# Patient Record
Sex: FEMALE | Employment: OTHER | URBAN - METROPOLITAN AREA
[De-identification: names, ages, dates, MRNs, and addresses within clinical notes are randomized per-mention and may not be internally consistent; named-entity substitution may affect disease eponyms.]

---

## 2020-01-01 ENCOUNTER — DOCUMENTATION ONLY (OUTPATIENT)
Dept: OTHER | Facility: CLINIC | Age: 85
End: 2020-01-01

## 2020-01-01 ENCOUNTER — APPOINTMENT (OUTPATIENT)
Dept: CT IMAGING | Facility: CLINIC | Age: 85
DRG: 064 | End: 2020-01-01
Attending: EMERGENCY MEDICINE
Payer: COMMERCIAL

## 2020-01-01 ENCOUNTER — HOSPITAL ENCOUNTER (OUTPATIENT)
Facility: CLINIC | Age: 85
End: 2020-01-01
Admitting: RADIOLOGY
Payer: COMMERCIAL

## 2020-01-01 ENCOUNTER — MEDICAL CORRESPONDENCE (OUTPATIENT)
Dept: HEALTH INFORMATION MANAGEMENT | Facility: CLINIC | Age: 85
End: 2020-01-01

## 2020-01-01 ENCOUNTER — APPOINTMENT (OUTPATIENT)
Dept: SPEECH THERAPY | Facility: CLINIC | Age: 85
DRG: 064 | End: 2020-01-01
Attending: INTERNAL MEDICINE
Payer: COMMERCIAL

## 2020-01-01 ENCOUNTER — APPOINTMENT (OUTPATIENT)
Dept: GENERAL RADIOLOGY | Facility: CLINIC | Age: 85
DRG: 064 | End: 2020-01-01
Attending: EMERGENCY MEDICINE
Payer: COMMERCIAL

## 2020-01-01 ENCOUNTER — APPOINTMENT (OUTPATIENT)
Dept: MRI IMAGING | Facility: CLINIC | Age: 85
DRG: 064 | End: 2020-01-01
Payer: COMMERCIAL

## 2020-01-01 ENCOUNTER — APPOINTMENT (OUTPATIENT)
Dept: CARDIOLOGY | Facility: CLINIC | Age: 85
DRG: 064 | End: 2020-01-01
Payer: COMMERCIAL

## 2020-01-01 ENCOUNTER — HOSPITAL ENCOUNTER (EMERGENCY)
Facility: CLINIC | Age: 85
End: 2020-01-01

## 2020-01-01 ENCOUNTER — HOSPITAL ENCOUNTER (INPATIENT)
Facility: CLINIC | Age: 85
LOS: 3 days | DRG: 064 | End: 2020-02-03
Attending: EMERGENCY MEDICINE | Admitting: INTERNAL MEDICINE
Payer: COMMERCIAL

## 2020-01-01 ENCOUNTER — TRANSFERRED RECORDS (OUTPATIENT)
Dept: HEALTH INFORMATION MANAGEMENT | Facility: CLINIC | Age: 85
End: 2020-01-01

## 2020-01-01 VITALS
BODY MASS INDEX: 30.9 KG/M2 | HEIGHT: 58 IN | OXYGEN SATURATION: 96 % | DIASTOLIC BLOOD PRESSURE: 55 MMHG | HEART RATE: 99 BPM | TEMPERATURE: 97.3 F | RESPIRATION RATE: 20 BRPM | SYSTOLIC BLOOD PRESSURE: 112 MMHG | WEIGHT: 147.2 LBS

## 2020-01-01 DIAGNOSIS — R41.82 ALTERED MENTAL STATUS, UNSPECIFIED ALTERED MENTAL STATUS TYPE: ICD-10-CM

## 2020-01-01 DIAGNOSIS — E86.0 DEHYDRATION: ICD-10-CM

## 2020-01-01 DIAGNOSIS — C18.9 MALIGNANT NEOPLASM OF COLON, UNSPECIFIED PART OF COLON (H): ICD-10-CM

## 2020-01-01 DIAGNOSIS — R29.90 STROKE-LIKE SYMPTOMS: ICD-10-CM

## 2020-01-01 LAB
ALBUMIN SERPL-MCNC: 2.4 G/DL (ref 3.4–5)
ALBUMIN UR-MCNC: 10 MG/DL
ALP SERPL-CCNC: 153 U/L (ref 40–150)
ALT SERPL W P-5'-P-CCNC: 16 U/L (ref 0–50)
ANION GAP SERPL CALCULATED.3IONS-SCNC: 10 MMOL/L (ref 3–14)
APPEARANCE UR: ABNORMAL
AST SERPL W P-5'-P-CCNC: 80 U/L (ref 0–45)
BASOPHILS # BLD AUTO: 0 10E9/L (ref 0–0.2)
BASOPHILS NFR BLD AUTO: 0.1 %
BILIRUB SERPL-MCNC: 1.2 MG/DL (ref 0.2–1.3)
BILIRUB UR QL STRIP: NEGATIVE
BUN SERPL-MCNC: 38 MG/DL (ref 7–30)
CALCIUM SERPL-MCNC: 9.1 MG/DL (ref 8.5–10.1)
CHLORIDE SERPL-SCNC: 100 MMOL/L (ref 94–109)
CO2 SERPL-SCNC: 23 MMOL/L (ref 20–32)
COLOR UR AUTO: YELLOW
CREAT SERPL-MCNC: 1.24 MG/DL (ref 0.52–1.04)
DIFFERENTIAL METHOD BLD: ABNORMAL
EOSINOPHIL # BLD AUTO: 0 10E9/L (ref 0–0.7)
EOSINOPHIL NFR BLD AUTO: 0 %
ERYTHROCYTE [DISTWIDTH] IN BLOOD BY AUTOMATED COUNT: 17.7 % (ref 10–15)
GFR SERPL CREATININE-BSD FRML MDRD: 38 ML/MIN/{1.73_M2}
GLUCOSE BLDC GLUCOMTR-MCNC: 160 MG/DL (ref 70–99)
GLUCOSE BLDC GLUCOMTR-MCNC: 171 MG/DL (ref 70–99)
GLUCOSE BLDC GLUCOMTR-MCNC: 177 MG/DL (ref 70–99)
GLUCOSE BLDC GLUCOMTR-MCNC: 181 MG/DL (ref 70–99)
GLUCOSE BLDC GLUCOMTR-MCNC: 185 MG/DL (ref 70–99)
GLUCOSE BLDC GLUCOMTR-MCNC: 209 MG/DL (ref 70–99)
GLUCOSE BLDC GLUCOMTR-MCNC: 210 MG/DL (ref 70–99)
GLUCOSE BLDC GLUCOMTR-MCNC: 215 MG/DL (ref 70–99)
GLUCOSE BLDC GLUCOMTR-MCNC: 217 MG/DL (ref 70–99)
GLUCOSE BLDC GLUCOMTR-MCNC: 225 MG/DL (ref 70–99)
GLUCOSE BLDC GLUCOMTR-MCNC: 240 MG/DL (ref 70–99)
GLUCOSE BLDC GLUCOMTR-MCNC: 242 MG/DL (ref 70–99)
GLUCOSE SERPL-MCNC: 276 MG/DL (ref 70–99)
GLUCOSE UR STRIP-MCNC: NEGATIVE MG/DL
HBA1C MFR BLD: 8.6 % (ref 0–5.6)
HCT VFR BLD AUTO: 36.5 % (ref 35–47)
HGB BLD-MCNC: 11.7 G/DL (ref 11.7–15.7)
HGB UR QL STRIP: NEGATIVE
HYALINE CASTS #/AREA URNS LPF: 21 /LPF (ref 0–2)
IMM GRANULOCYTES # BLD: 0.3 10E9/L (ref 0–0.4)
IMM GRANULOCYTES NFR BLD: 1.3 %
INTERPRETATION ECG - MUSE: NORMAL
INTERPRETATION ECG - MUSE: NORMAL
KETONES UR STRIP-MCNC: NEGATIVE MG/DL
LACTATE BLD-SCNC: 2.3 MMOL/L (ref 0.7–2)
LACTATE BLD-SCNC: 3.1 MMOL/L (ref 0.7–2)
LEUKOCYTE ESTERASE UR QL STRIP: ABNORMAL
LIPASE SERPL-CCNC: 95 U/L (ref 73–393)
LYMPHOCYTES # BLD AUTO: 0.5 10E9/L (ref 0.8–5.3)
LYMPHOCYTES NFR BLD AUTO: 2.3 %
MAGNESIUM SERPL-MCNC: 2 MG/DL (ref 1.6–2.3)
MCH RBC QN AUTO: 30.2 PG (ref 26.5–33)
MCHC RBC AUTO-ENTMCNC: 32.1 G/DL (ref 31.5–36.5)
MCV RBC AUTO: 94 FL (ref 78–100)
MONOCYTES # BLD AUTO: 0.7 10E9/L (ref 0–1.3)
MONOCYTES NFR BLD AUTO: 3.3 %
MUCOUS THREADS #/AREA URNS LPF: PRESENT /LPF
NEUTROPHILS # BLD AUTO: 20.7 10E9/L (ref 1.6–8.3)
NEUTROPHILS NFR BLD AUTO: 93 %
NITRATE UR QL: NEGATIVE
PH UR STRIP: 5 PH (ref 5–7)
PLATELET # BLD AUTO: 220 10E9/L (ref 150–450)
POTASSIUM SERPL-SCNC: 4.2 MMOL/L (ref 3.4–5.3)
PROT SERPL-MCNC: 6 G/DL (ref 6.8–8.8)
RBC # BLD AUTO: 3.88 10E12/L (ref 3.8–5.2)
RBC #/AREA URNS AUTO: 2 /HPF (ref 0–2)
SODIUM SERPL-SCNC: 133 MMOL/L (ref 133–144)
SOURCE: ABNORMAL
SP GR UR STRIP: 1.02 (ref 1–1.03)
SQUAMOUS #/AREA URNS AUTO: 1 /HPF (ref 0–1)
UROBILINOGEN UR STRIP-MCNC: 2 MG/DL (ref 0–2)
WBC # BLD AUTO: 22.3 10E9/L (ref 4–11)
WBC #/AREA URNS AUTO: 5 /HPF (ref 0–5)
YEAST #/AREA URNS HPF: ABNORMAL /HPF

## 2020-01-01 PROCEDURE — 99223 1ST HOSP IP/OBS HIGH 75: CPT | Mod: AI | Performed by: INTERNAL MEDICINE

## 2020-01-01 PROCEDURE — 25800030 ZZH RX IP 258 OP 636: Performed by: INTERNAL MEDICINE

## 2020-01-01 PROCEDURE — 70496 CT ANGIOGRAPHY HEAD: CPT

## 2020-01-01 PROCEDURE — 25000125 ZZHC RX 250: Performed by: EMERGENCY MEDICINE

## 2020-01-01 PROCEDURE — 25000128 H RX IP 250 OP 636: Performed by: PHYSICIAN ASSISTANT

## 2020-01-01 PROCEDURE — 99207 ZZC APP CREDIT; MD BILLING SHARED VISIT: CPT | Performed by: INTERNAL MEDICINE

## 2020-01-01 PROCEDURE — 93005 ELECTROCARDIOGRAM TRACING: CPT

## 2020-01-01 PROCEDURE — 99233 SBSQ HOSP IP/OBS HIGH 50: CPT | Performed by: INTERNAL MEDICINE

## 2020-01-01 PROCEDURE — 25000125 ZZHC RX 250: Performed by: HOSPITALIST

## 2020-01-01 PROCEDURE — 95819 EEG AWAKE AND ASLEEP: CPT

## 2020-01-01 PROCEDURE — 40000895 ZZH STATISTIC SLP IP EVAL DEFER

## 2020-01-01 PROCEDURE — 12000000 ZZH R&B MED SURG/OB

## 2020-01-01 PROCEDURE — 83690 ASSAY OF LIPASE: CPT | Performed by: EMERGENCY MEDICINE

## 2020-01-01 PROCEDURE — 83605 ASSAY OF LACTIC ACID: CPT | Performed by: INTERNAL MEDICINE

## 2020-01-01 PROCEDURE — 94640 AIRWAY INHALATION TREATMENT: CPT

## 2020-01-01 PROCEDURE — 99233 SBSQ HOSP IP/OBS HIGH 50: CPT | Mod: GC | Performed by: PSYCHIATRY & NEUROLOGY

## 2020-01-01 PROCEDURE — 83605 ASSAY OF LACTIC ACID: CPT | Performed by: EMERGENCY MEDICINE

## 2020-01-01 PROCEDURE — 0042T CT HEAD PERFUSION WITH CONTRAST: CPT

## 2020-01-01 PROCEDURE — 25000132 ZZH RX MED GY IP 250 OP 250 PS 637: Performed by: INTERNAL MEDICINE

## 2020-01-01 PROCEDURE — 70450 CT HEAD/BRAIN W/O DYE: CPT

## 2020-01-01 PROCEDURE — 71046 X-RAY EXAM CHEST 2 VIEWS: CPT

## 2020-01-01 PROCEDURE — 95813 EEG EXTND MNTR 61-119 MIN: CPT

## 2020-01-01 PROCEDURE — 85025 COMPLETE CBC W/AUTO DIFF WBC: CPT | Performed by: EMERGENCY MEDICINE

## 2020-01-01 PROCEDURE — 96361 HYDRATE IV INFUSION ADD-ON: CPT

## 2020-01-01 PROCEDURE — 25000132 ZZH RX MED GY IP 250 OP 250 PS 637: Performed by: STUDENT IN AN ORGANIZED HEALTH CARE EDUCATION/TRAINING PROGRAM

## 2020-01-01 PROCEDURE — 87040 BLOOD CULTURE FOR BACTERIA: CPT | Performed by: EMERGENCY MEDICINE

## 2020-01-01 PROCEDURE — 96374 THER/PROPH/DIAG INJ IV PUSH: CPT | Mod: 59

## 2020-01-01 PROCEDURE — 00000146 ZZHCL STATISTIC GLUCOSE BY METER IP

## 2020-01-01 PROCEDURE — 99207 ZZC MOONLIGHTING INDICATOR: CPT | Performed by: INTERNAL MEDICINE

## 2020-01-01 PROCEDURE — 25000125 ZZHC RX 250: Performed by: INTERNAL MEDICINE

## 2020-01-01 PROCEDURE — 92526 ORAL FUNCTION THERAPY: CPT | Mod: GN | Performed by: SPEECH-LANGUAGE PATHOLOGIST

## 2020-01-01 PROCEDURE — 25800030 ZZH RX IP 258 OP 636: Performed by: EMERGENCY MEDICINE

## 2020-01-01 PROCEDURE — 92610 EVALUATE SWALLOWING FUNCTION: CPT | Mod: GN | Performed by: SPEECH-LANGUAGE PATHOLOGIST

## 2020-01-01 PROCEDURE — A9585 GADOBUTROL INJECTION: HCPCS | Performed by: EMERGENCY MEDICINE

## 2020-01-01 PROCEDURE — 99221 1ST HOSP IP/OBS SF/LOW 40: CPT | Mod: GC | Performed by: PSYCHIATRY & NEUROLOGY

## 2020-01-01 PROCEDURE — 80053 COMPREHEN METABOLIC PANEL: CPT | Performed by: EMERGENCY MEDICINE

## 2020-01-01 PROCEDURE — 25500064 ZZH RX 255 OP 636: Performed by: INTERNAL MEDICINE

## 2020-01-01 PROCEDURE — 25000128 H RX IP 250 OP 636: Performed by: HOSPITALIST

## 2020-01-01 PROCEDURE — 25000128 H RX IP 250 OP 636: Performed by: STUDENT IN AN ORGANIZED HEALTH CARE EDUCATION/TRAINING PROGRAM

## 2020-01-01 PROCEDURE — 40000061 ZZH STATISTIC EEG TIME EA 10 MIN

## 2020-01-01 PROCEDURE — 93010 ELECTROCARDIOGRAM REPORT: CPT | Performed by: INTERNAL MEDICINE

## 2020-01-01 PROCEDURE — 25000132 ZZH RX MED GY IP 250 OP 250 PS 637: Performed by: EMERGENCY MEDICINE

## 2020-01-01 PROCEDURE — 83735 ASSAY OF MAGNESIUM: CPT | Performed by: EMERGENCY MEDICINE

## 2020-01-01 PROCEDURE — 93306 TTE W/DOPPLER COMPLETE: CPT | Mod: 26 | Performed by: INTERNAL MEDICINE

## 2020-01-01 PROCEDURE — 70553 MRI BRAIN STEM W/O & W/DYE: CPT

## 2020-01-01 PROCEDURE — 25500064 ZZH RX 255 OP 636: Performed by: EMERGENCY MEDICINE

## 2020-01-01 PROCEDURE — 83036 HEMOGLOBIN GLYCOSYLATED A1C: CPT | Performed by: EMERGENCY MEDICINE

## 2020-01-01 PROCEDURE — 81001 URINALYSIS AUTO W/SCOPE: CPT | Performed by: EMERGENCY MEDICINE

## 2020-01-01 PROCEDURE — 99291 CRITICAL CARE FIRST HOUR: CPT

## 2020-01-01 PROCEDURE — 40000275 ZZH STATISTIC RCP TIME EA 10 MIN

## 2020-01-01 PROCEDURE — 94640 AIRWAY INHALATION TREATMENT: CPT | Mod: 76

## 2020-01-01 PROCEDURE — 25000128 H RX IP 250 OP 636: Performed by: INTERNAL MEDICINE

## 2020-01-01 PROCEDURE — 99238 HOSP IP/OBS DSCHRG MGMT 30/<: CPT | Performed by: NURSE PRACTITIONER

## 2020-01-01 PROCEDURE — 25000131 ZZH RX MED GY IP 250 OP 636 PS 637: Performed by: INTERNAL MEDICINE

## 2020-01-01 PROCEDURE — 25000128 H RX IP 250 OP 636: Performed by: EMERGENCY MEDICINE

## 2020-01-01 PROCEDURE — 36415 COLL VENOUS BLD VENIPUNCTURE: CPT | Performed by: INTERNAL MEDICINE

## 2020-01-01 RX ORDER — ONDANSETRON 4 MG/1
4 TABLET, ORALLY DISINTEGRATING ORAL EVERY 6 HOURS PRN
Status: DISCONTINUED | OUTPATIENT
Start: 2020-01-01 | End: 2020-01-01 | Stop reason: HOSPADM

## 2020-01-01 RX ORDER — POTASSIUM CHLORIDE 29.8 MG/ML
20 INJECTION INTRAVENOUS
Status: DISCONTINUED | OUTPATIENT
Start: 2020-01-01 | End: 2020-01-01 | Stop reason: CLARIF

## 2020-01-01 RX ORDER — SALIVA STIMULANT COMB. NO.3
1 SPRAY, NON-AEROSOL (ML) MUCOUS MEMBRANE
Status: DISCONTINUED | OUTPATIENT
Start: 2020-01-01 | End: 2020-01-01 | Stop reason: HOSPADM

## 2020-01-01 RX ORDER — SIMVASTATIN 20 MG
20 TABLET ORAL EVERY EVENING
Status: DISCONTINUED | OUTPATIENT
Start: 2020-01-01 | End: 2020-01-01

## 2020-01-01 RX ORDER — ACETAMINOPHEN 325 MG/1
650 TABLET ORAL EVERY 6 HOURS PRN
Status: DISCONTINUED | OUTPATIENT
Start: 2020-01-01 | End: 2020-01-01 | Stop reason: HOSPADM

## 2020-01-01 RX ORDER — CAPECITABINE 500 MG/1
1000 TABLET, FILM COATED ORAL 2 TIMES DAILY
COMMUNITY

## 2020-01-01 RX ORDER — POTASSIUM CHLORIDE 7.45 MG/ML
10 INJECTION INTRAVENOUS
Status: DISCONTINUED | OUTPATIENT
Start: 2020-01-01 | End: 2020-01-01

## 2020-01-01 RX ORDER — HEPARIN SODIUM 5000 [USP'U]/.5ML
5000 INJECTION, SOLUTION INTRAVENOUS; SUBCUTANEOUS EVERY 12 HOURS
Status: DISCONTINUED | OUTPATIENT
Start: 2020-01-01 | End: 2020-01-01

## 2020-01-01 RX ORDER — NALOXONE HYDROCHLORIDE 0.4 MG/ML
.1-.4 INJECTION, SOLUTION INTRAMUSCULAR; INTRAVENOUS; SUBCUTANEOUS
Status: DISCONTINUED | OUTPATIENT
Start: 2020-01-01 | End: 2020-01-01 | Stop reason: HOSPADM

## 2020-01-01 RX ORDER — POLYETHYLENE GLYCOL 3350 17 G/17G
17 POWDER, FOR SOLUTION ORAL DAILY PRN
Status: DISCONTINUED | OUTPATIENT
Start: 2020-01-01 | End: 2020-01-01 | Stop reason: HOSPADM

## 2020-01-01 RX ORDER — HYDROMORPHONE HYDROCHLORIDE 1 MG/ML
1-2 SOLUTION ORAL
Status: DISCONTINUED | OUTPATIENT
Start: 2020-01-01 | End: 2020-01-01 | Stop reason: ALTCHOICE

## 2020-01-01 RX ORDER — MORPHINE SULFATE 100 MG/5ML
10-20 SOLUTION ORAL
Status: DISCONTINUED | OUTPATIENT
Start: 2020-01-01 | End: 2020-01-01 | Stop reason: HOSPADM

## 2020-01-01 RX ORDER — FUROSEMIDE 10 MG/ML
20 INJECTION INTRAMUSCULAR; INTRAVENOUS ONCE
Status: COMPLETED | OUTPATIENT
Start: 2020-01-01 | End: 2020-01-01

## 2020-01-01 RX ORDER — POTASSIUM CL/LIDO/0.9 % NACL 10MEQ/0.1L
10 INTRAVENOUS SOLUTION, PIGGYBACK (ML) INTRAVENOUS
Status: DISCONTINUED | OUTPATIENT
Start: 2020-01-01 | End: 2020-01-01

## 2020-01-01 RX ORDER — AMOXICILLIN 250 MG
1 CAPSULE ORAL 2 TIMES DAILY PRN
Status: DISCONTINUED | OUTPATIENT
Start: 2020-01-01 | End: 2020-01-01 | Stop reason: HOSPADM

## 2020-01-01 RX ORDER — CYCLOSPORINE 0.5 MG/ML
1 EMULSION OPHTHALMIC 2 TIMES DAILY
COMMUNITY

## 2020-01-01 RX ORDER — BISACODYL 10 MG
10 SUPPOSITORY, RECTAL RECTAL DAILY PRN
Status: DISCONTINUED | OUTPATIENT
Start: 2020-01-01 | End: 2020-01-01 | Stop reason: HOSPADM

## 2020-01-01 RX ORDER — ASPIRIN 81 MG/1
81 TABLET, CHEWABLE ORAL DAILY
Status: DISCONTINUED | OUTPATIENT
Start: 2020-01-01 | End: 2020-01-01

## 2020-01-01 RX ORDER — PROCHLORPERAZINE 25 MG
12.5 SUPPOSITORY, RECTAL RECTAL EVERY 12 HOURS PRN
Status: DISCONTINUED | OUTPATIENT
Start: 2020-01-01 | End: 2020-01-01 | Stop reason: HOSPADM

## 2020-01-01 RX ORDER — IOPAMIDOL 755 MG/ML
120 INJECTION, SOLUTION INTRAVASCULAR ONCE
Status: COMPLETED | OUTPATIENT
Start: 2020-01-01 | End: 2020-01-01

## 2020-01-01 RX ORDER — SIMVASTATIN 10 MG
10 TABLET ORAL AT BEDTIME
COMMUNITY

## 2020-01-01 RX ORDER — MORPHINE SULFATE 10 MG/5ML
10 SOLUTION ORAL
Status: DISCONTINUED | OUTPATIENT
Start: 2020-01-01 | End: 2020-01-01 | Stop reason: DRUGHIGH

## 2020-01-01 RX ORDER — LORAZEPAM 2 MG/ML
.5-1 INJECTION INTRAMUSCULAR
Status: DISCONTINUED | OUTPATIENT
Start: 2020-01-01 | End: 2020-01-01 | Stop reason: HOSPADM

## 2020-01-01 RX ORDER — OXYCODONE HYDROCHLORIDE 5 MG/1
5 TABLET ORAL EVERY 4 HOURS PRN
COMMUNITY

## 2020-01-01 RX ORDER — LORAZEPAM 2 MG/ML
2 INJECTION INTRAMUSCULAR ONCE
Status: COMPLETED | OUTPATIENT
Start: 2020-01-01 | End: 2020-01-01

## 2020-01-01 RX ORDER — POTASSIUM CHLORIDE 1.5 G/1.58G
20-40 POWDER, FOR SOLUTION ORAL
Status: DISCONTINUED | OUTPATIENT
Start: 2020-01-01 | End: 2020-01-01

## 2020-01-01 RX ORDER — METOPROLOL TARTRATE 1 MG/ML
5 INJECTION, SOLUTION INTRAVENOUS EVERY 4 HOURS PRN
Status: DISCONTINUED | OUTPATIENT
Start: 2020-01-01 | End: 2020-01-01 | Stop reason: HOSPADM

## 2020-01-01 RX ORDER — GLIMEPIRIDE 2 MG/1
2 TABLET ORAL EVERY EVENING
COMMUNITY

## 2020-01-01 RX ORDER — AMLODIPINE BESYLATE 2.5 MG/1
2.5 TABLET ORAL EVERY EVENING
COMMUNITY

## 2020-01-01 RX ORDER — GLYCOPYRROLATE 1 MG/1
2-4 TABLET ORAL EVERY 4 HOURS PRN
Status: DISCONTINUED | OUTPATIENT
Start: 2020-01-01 | End: 2020-01-01 | Stop reason: HOSPADM

## 2020-01-01 RX ORDER — LOSARTAN POTASSIUM 25 MG/1
25 TABLET ORAL EVERY EVENING
COMMUNITY

## 2020-01-01 RX ORDER — LORAZEPAM 0.5 MG/1
.5-1 TABLET ORAL
Status: DISCONTINUED | OUTPATIENT
Start: 2020-01-01 | End: 2020-01-01 | Stop reason: HOSPADM

## 2020-01-01 RX ORDER — AMOXICILLIN 250 MG
2 CAPSULE ORAL 2 TIMES DAILY PRN
Status: DISCONTINUED | OUTPATIENT
Start: 2020-01-01 | End: 2020-01-01 | Stop reason: HOSPADM

## 2020-01-01 RX ORDER — LIDOCAINE 40 MG/G
CREAM TOPICAL
Status: DISCONTINUED | OUTPATIENT
Start: 2020-01-01 | End: 2020-01-01 | Stop reason: HOSPADM

## 2020-01-01 RX ORDER — HYDROMORPHONE HYDROCHLORIDE 1 MG/ML
.3-.5 INJECTION, SOLUTION INTRAMUSCULAR; INTRAVENOUS; SUBCUTANEOUS
Status: DISCONTINUED | OUTPATIENT
Start: 2020-01-01 | End: 2020-01-01 | Stop reason: HOSPADM

## 2020-01-01 RX ORDER — OXYCODONE HCL 10 MG/1
10 TABLET, FILM COATED, EXTENDED RELEASE ORAL EVERY MORNING
COMMUNITY

## 2020-01-01 RX ORDER — ASPIRIN 300 MG/1
300 SUPPOSITORY RECTAL ONCE
Status: COMPLETED | OUTPATIENT
Start: 2020-01-01 | End: 2020-01-01

## 2020-01-01 RX ORDER — PROCHLORPERAZINE MALEATE 5 MG
5 TABLET ORAL EVERY 6 HOURS PRN
Status: DISCONTINUED | OUTPATIENT
Start: 2020-01-01 | End: 2020-01-01 | Stop reason: HOSPADM

## 2020-01-01 RX ORDER — ONDANSETRON 2 MG/ML
4 INJECTION INTRAMUSCULAR; INTRAVENOUS EVERY 6 HOURS PRN
Status: DISCONTINUED | OUTPATIENT
Start: 2020-01-01 | End: 2020-01-01 | Stop reason: HOSPADM

## 2020-01-01 RX ORDER — HALOPERIDOL 5 MG/ML
.5-1 INJECTION INTRAMUSCULAR
Status: DISCONTINUED | OUTPATIENT
Start: 2020-01-01 | End: 2020-01-01 | Stop reason: HOSPADM

## 2020-01-01 RX ORDER — DEXTROSE MONOHYDRATE 25 G/50ML
25-50 INJECTION, SOLUTION INTRAVENOUS
Status: DISCONTINUED | OUTPATIENT
Start: 2020-01-01 | End: 2020-01-01 | Stop reason: HOSPADM

## 2020-01-01 RX ORDER — POTASSIUM CHLORIDE 1500 MG/1
20-40 TABLET, EXTENDED RELEASE ORAL
Status: DISCONTINUED | OUTPATIENT
Start: 2020-01-01 | End: 2020-01-01

## 2020-01-01 RX ORDER — ASPIRIN 81 MG/1
81 TABLET ORAL DAILY
COMMUNITY

## 2020-01-01 RX ORDER — ATROPINE SULFATE 10 MG/ML
1-2 SOLUTION/ DROPS OPHTHALMIC
Status: DISCONTINUED | OUTPATIENT
Start: 2020-01-01 | End: 2020-01-01 | Stop reason: HOSPADM

## 2020-01-01 RX ORDER — FUROSEMIDE 20 MG
20 TABLET ORAL EVERY MORNING
COMMUNITY

## 2020-01-01 RX ORDER — SODIUM CHLORIDE 9 MG/ML
INJECTION, SOLUTION INTRAVENOUS CONTINUOUS
Status: DISCONTINUED | OUTPATIENT
Start: 2020-01-01 | End: 2020-01-01

## 2020-01-01 RX ORDER — LEVALBUTEROL 1.25 MG/.5ML
1.25 SOLUTION, CONCENTRATE RESPIRATORY (INHALATION) EVERY 4 HOURS
Status: DISCONTINUED | OUTPATIENT
Start: 2020-01-01 | End: 2020-01-01 | Stop reason: HOSPADM

## 2020-01-01 RX ORDER — ASPIRIN 300 MG/1
300 SUPPOSITORY RECTAL DAILY
Status: DISCONTINUED | OUTPATIENT
Start: 2020-01-01 | End: 2020-01-01

## 2020-01-01 RX ORDER — GLYCOPYRROLATE 0.2 MG/ML
.2-.4 INJECTION, SOLUTION INTRAMUSCULAR; INTRAVENOUS EVERY 4 HOURS PRN
Status: DISCONTINUED | OUTPATIENT
Start: 2020-01-01 | End: 2020-01-01 | Stop reason: HOSPADM

## 2020-01-01 RX ORDER — ACETAMINOPHEN 325 MG/1
650 TABLET ORAL EVERY 4 HOURS PRN
Status: DISCONTINUED | OUTPATIENT
Start: 2020-01-01 | End: 2020-01-01 | Stop reason: HOSPADM

## 2020-01-01 RX ORDER — HYDROMORPHONE HYDROCHLORIDE 1 MG/ML
.3-.5 INJECTION, SOLUTION INTRAMUSCULAR; INTRAVENOUS; SUBCUTANEOUS
Status: DISCONTINUED | OUTPATIENT
Start: 2020-01-01 | End: 2020-01-01

## 2020-01-01 RX ORDER — NICOTINE POLACRILEX 4 MG
15-30 LOZENGE BUCCAL
Status: DISCONTINUED | OUTPATIENT
Start: 2020-01-01 | End: 2020-01-01 | Stop reason: HOSPADM

## 2020-01-01 RX ORDER — GADOBUTROL 604.72 MG/ML
6 INJECTION INTRAVENOUS ONCE
Status: COMPLETED | OUTPATIENT
Start: 2020-01-01 | End: 2020-01-01

## 2020-01-01 RX ORDER — LORAZEPAM 2 MG/ML
2 INJECTION INTRAMUSCULAR
Status: DISCONTINUED | OUTPATIENT
Start: 2020-01-01 | End: 2020-01-01 | Stop reason: HOSPADM

## 2020-01-01 RX ORDER — ACETAMINOPHEN 650 MG/1
650 SUPPOSITORY RECTAL EVERY 4 HOURS PRN
Status: DISCONTINUED | OUTPATIENT
Start: 2020-01-01 | End: 2020-01-01 | Stop reason: HOSPADM

## 2020-01-01 RX ORDER — BISACODYL 10 MG
10 SUPPOSITORY, RECTAL RECTAL
Status: DISCONTINUED | OUTPATIENT
Start: 2020-02-05 | End: 2020-01-01 | Stop reason: HOSPADM

## 2020-01-01 RX ORDER — OXYCODONE HCL 20 MG/1
20 TABLET, FILM COATED, EXTENDED RELEASE ORAL EVERY EVENING
COMMUNITY

## 2020-01-01 RX ADMIN — MORPHINE SULFATE 10 MG: 100 SOLUTION ORAL at 23:33

## 2020-01-01 RX ADMIN — INSULIN ASPART 2 UNITS: 100 INJECTION, SOLUTION INTRAVENOUS; SUBCUTANEOUS at 08:09

## 2020-01-01 RX ADMIN — HYDROMORPHONE HYDROCHLORIDE 0.4 MG: 1 INJECTION, SOLUTION INTRAMUSCULAR; INTRAVENOUS; SUBCUTANEOUS at 02:20

## 2020-01-01 RX ADMIN — LEVALBUTEROL HYDROCHLORIDE 1.25 MG: 1.25 SOLUTION, CONCENTRATE RESPIRATORY (INHALATION) at 13:15

## 2020-01-01 RX ADMIN — HYDROMORPHONE HYDROCHLORIDE 0.5 MG: 1 INJECTION, SOLUTION INTRAMUSCULAR; INTRAVENOUS; SUBCUTANEOUS at 08:41

## 2020-01-01 RX ADMIN — INSULIN ASPART 2 UNITS: 100 INJECTION, SOLUTION INTRAVENOUS; SUBCUTANEOUS at 00:08

## 2020-01-01 RX ADMIN — HYDROMORPHONE HYDROCHLORIDE 0.3 MG: 1 INJECTION, SOLUTION INTRAMUSCULAR; INTRAVENOUS; SUBCUTANEOUS at 23:57

## 2020-01-01 RX ADMIN — METOPROLOL TARTRATE 5 MG: 5 INJECTION INTRAVENOUS at 08:41

## 2020-01-01 RX ADMIN — HYDROMORPHONE HYDROCHLORIDE 2 MG: 1 SOLUTION ORAL at 14:47

## 2020-01-01 RX ADMIN — ASPIRIN 300 MG: 300 SUPPOSITORY RECTAL at 14:01

## 2020-01-01 RX ADMIN — SODIUM CHLORIDE: 9 INJECTION, SOLUTION INTRAVENOUS at 11:00

## 2020-01-01 RX ADMIN — SODIUM CHLORIDE 100 ML: 9 INJECTION, SOLUTION INTRAVENOUS at 10:46

## 2020-01-01 RX ADMIN — SODIUM CHLORIDE, POTASSIUM CHLORIDE, SODIUM LACTATE AND CALCIUM CHLORIDE 1000 ML: 600; 310; 30; 20 INJECTION, SOLUTION INTRAVENOUS at 11:51

## 2020-01-01 RX ADMIN — MORPHINE SULFATE 10 MG: 100 SOLUTION ORAL at 17:46

## 2020-01-01 RX ADMIN — MORPHINE SULFATE 10 MG: 100 SOLUTION ORAL at 00:30

## 2020-01-01 RX ADMIN — SODIUM CHLORIDE 500 ML: 9 INJECTION, SOLUTION INTRAVENOUS at 04:37

## 2020-01-01 RX ADMIN — LEVALBUTEROL HYDROCHLORIDE 1.25 MG: 1.25 SOLUTION, CONCENTRATE RESPIRATORY (INHALATION) at 09:08

## 2020-01-01 RX ADMIN — HYDROMORPHONE HYDROCHLORIDE 0.5 MG: 1 INJECTION, SOLUTION INTRAMUSCULAR; INTRAVENOUS; SUBCUTANEOUS at 04:26

## 2020-01-01 RX ADMIN — SODIUM CHLORIDE: 9 INJECTION, SOLUTION INTRAVENOUS at 18:08

## 2020-01-01 RX ADMIN — HYDROMORPHONE HYDROCHLORIDE 0.3 MG: 1 INJECTION, SOLUTION INTRAMUSCULAR; INTRAVENOUS; SUBCUTANEOUS at 06:49

## 2020-01-01 RX ADMIN — METOPROLOL TARTRATE 5 MG: 5 INJECTION INTRAVENOUS at 00:44

## 2020-01-01 RX ADMIN — FUROSEMIDE 20 MG: 10 INJECTION, SOLUTION INTRAVENOUS at 00:31

## 2020-01-01 RX ADMIN — ACETAMINOPHEN 650 MG: 650 SUPPOSITORY RECTAL at 01:02

## 2020-01-01 RX ADMIN — MORPHINE SULFATE 10 MG: 100 SOLUTION ORAL at 20:50

## 2020-01-01 RX ADMIN — ASPIRIN 300 MG: 300 SUPPOSITORY RECTAL at 11:47

## 2020-01-01 RX ADMIN — HYDROMORPHONE HYDROCHLORIDE 1 MG: 1 SOLUTION ORAL at 12:20

## 2020-01-01 RX ADMIN — HYDROMORPHONE HYDROCHLORIDE 0.3 MG: 1 INJECTION, SOLUTION INTRAMUSCULAR; INTRAVENOUS; SUBCUTANEOUS at 02:12

## 2020-01-01 RX ADMIN — HYDROMORPHONE HYDROCHLORIDE 1 MG: 1 SOLUTION ORAL at 13:29

## 2020-01-01 RX ADMIN — HYDROMORPHONE HYDROCHLORIDE 0.3 MG: 1 INJECTION, SOLUTION INTRAMUSCULAR; INTRAVENOUS; SUBCUTANEOUS at 18:09

## 2020-01-01 RX ADMIN — LORAZEPAM 0.5 MG: 0.5 TABLET ORAL at 03:31

## 2020-01-01 RX ADMIN — HYDROMORPHONE HYDROCHLORIDE 0.2 MG: 1 INJECTION, SOLUTION INTRAMUSCULAR; INTRAVENOUS; SUBCUTANEOUS at 02:44

## 2020-01-01 RX ADMIN — HEPARIN SODIUM 5000 UNITS: 5000 INJECTION, SOLUTION INTRAVENOUS; SUBCUTANEOUS at 22:07

## 2020-01-01 RX ADMIN — MORPHINE SULFATE 10 MG: 100 SOLUTION ORAL at 02:47

## 2020-01-01 RX ADMIN — LORAZEPAM 0.5 MG: 0.5 TABLET ORAL at 23:49

## 2020-01-01 RX ADMIN — LORAZEPAM 0.5 MG: 0.5 TABLET ORAL at 20:04

## 2020-01-01 RX ADMIN — HYDROMORPHONE HYDROCHLORIDE 0.3 MG: 1 INJECTION, SOLUTION INTRAMUSCULAR; INTRAVENOUS; SUBCUTANEOUS at 22:07

## 2020-01-01 RX ADMIN — LORAZEPAM 1 MG: 2 INJECTION INTRAMUSCULAR; INTRAVENOUS at 11:14

## 2020-01-01 RX ADMIN — INSULIN ASPART 3 UNITS: 100 INJECTION, SOLUTION INTRAVENOUS; SUBCUTANEOUS at 19:27

## 2020-01-01 RX ADMIN — MORPHINE SULFATE 10 MG: 100 SOLUTION ORAL at 18:55

## 2020-01-01 RX ADMIN — MORPHINE SULFATE 10 MG: 100 SOLUTION ORAL at 22:00

## 2020-01-01 RX ADMIN — HEPARIN SODIUM 5000 UNITS: 5000 INJECTION, SOLUTION INTRAVENOUS; SUBCUTANEOUS at 12:06

## 2020-01-01 RX ADMIN — MORPHINE SULFATE 10 MG: 100 SOLUTION ORAL at 01:31

## 2020-01-01 RX ADMIN — HUMAN ALBUMIN MICROSPHERES AND PERFLUTREN 5 ML: 10; .22 INJECTION, SOLUTION INTRAVENOUS at 13:25

## 2020-01-01 RX ADMIN — GADOBUTROL 6 ML: 604.72 INJECTION INTRAVENOUS at 16:24

## 2020-01-01 RX ADMIN — ATROPINE SULFATE 1 DROP: 10 SOLUTION/ DROPS OPHTHALMIC at 02:58

## 2020-01-01 RX ADMIN — LEVALBUTEROL HYDROCHLORIDE 1.25 MG: 1.25 SOLUTION, CONCENTRATE RESPIRATORY (INHALATION) at 18:05

## 2020-01-01 RX ADMIN — INSULIN ASPART 3 UNITS: 100 INJECTION, SOLUTION INTRAVENOUS; SUBCUTANEOUS at 04:36

## 2020-01-01 RX ADMIN — HYDROMORPHONE HYDROCHLORIDE 0.3 MG: 1 INJECTION, SOLUTION INTRAMUSCULAR; INTRAVENOUS; SUBCUTANEOUS at 12:06

## 2020-01-01 RX ADMIN — IOPAMIDOL 120 ML: 755 INJECTION, SOLUTION INTRAVENOUS at 10:46

## 2020-01-01 SDOH — HEALTH STABILITY: MENTAL HEALTH: HOW OFTEN DO YOU HAVE A DRINK CONTAINING ALCOHOL?: NEVER

## 2020-01-01 ASSESSMENT — ENCOUNTER SYMPTOMS
DIZZINESS: 1
LIGHT-HEADEDNESS: 1
ARTHRALGIAS: 1
TREMORS: 1
SPEECH DIFFICULTY: 1
CONFUSION: 1
WEAKNESS: 1

## 2020-01-01 ASSESSMENT — ACTIVITIES OF DAILY LIVING (ADL)
ADLS_ACUITY_SCORE: 20
ADLS_ACUITY_SCORE: 16
ADLS_ACUITY_SCORE: 17
ADLS_ACUITY_SCORE: 22
ADLS_ACUITY_SCORE: 17
ADLS_ACUITY_SCORE: 22
ADLS_ACUITY_SCORE: 16
ADLS_ACUITY_SCORE: 18
ADLS_ACUITY_SCORE: 18
ADLS_ACUITY_SCORE: 22
ADLS_ACUITY_SCORE: 16

## 2020-01-01 ASSESSMENT — MIFFLIN-ST. JEOR
SCORE: 926.64
SCORE: 977.44

## 2020-01-31 PROBLEM — R41.82 ALTERED MENTAL STATUS: Status: ACTIVE | Noted: 2020-01-01

## 2020-01-31 PROBLEM — Z51.5 PALLIATIVE CARE PATIENT: Status: ACTIVE | Noted: 2019-01-01

## 2020-01-31 PROBLEM — M85.80 OSTEOPENIA: Status: ACTIVE | Noted: 2018-03-01

## 2020-01-31 NOTE — PROCEDURES
Procedure Date: 2020      ONE-HOUR ELECTROENCEPHALOGRAM WITH VIDEO       EEG # [facility]        DATE OF RECORDING/SERVICE DATE:  2020      CLINICAL SUMMARY:  The patient is a 89-year-old female with history of colon cancer, type 2 diabetes, hypertension, hyperlipidemia and diastolic heart failure, who arrived 2 days after her last chemotherapy after a fall with altered mental status.  EEG was performed to evaluate for seizures.     TECHNICAL SUMMARY: This continuous video- EEG monitoring procedure was performed with 23 scalp electrodes in 10-20 electrode system placement, and additional scalp, precordial and other surface electrodes used for electrical referencing and artifact detection.  Video monitoring was utilized and periodically reviewed by EEG technologists and the physician for electroclinical correlations.     INTERICTAL ACTIVITY:  No clear posterior dominant rhythm was appreciated.  Diffuse polymorphic 1-4 Hz delta slowing was present.  Occasional superimposed theta activity was seen.  The patient's eyes were closed and she appeared to be asleep; however, no well-organized vertex waves or sleep spindles were seen.      CLINICAL/ICTAL EVENTS:  No electrographic or clinical seizures were recorded.      IMPRESSION:  This is an abnormal video EEG due to the presence of diffuse delta slowing consistent with severe diffuse nonspecific encephalopathy.  No epileptiform discharges were present.  No electrographic or clinical seizures were recorded.  Clinical correlation advised.         JOAQUIN MURILLO MD             D: 2020   T: 2020   MT: CHING      Name:     REINALDO ANDREWS   MRN:      [id number]        Account:        [id number]   :      1929           Procedure Date: 2020      Document: [id number]

## 2020-01-31 NOTE — PROGRESS NOTES
RECEIVING UNIT ED HANDOFF REVIEW    ED Nurse Handoff Report was reviewed by: Debbie Remy RN on January 31, 2020 at 3:08 PM

## 2020-01-31 NOTE — ED TRIAGE NOTES
Had chemo on 2/28 . Was fine yesterday. This am confused and dizzy. Glucose has been running high due to chemo.    rx faxed. Will inform patient appointment is needed.

## 2020-01-31 NOTE — PLAN OF CARE
"MRI brain reviewed: Multiple embolic appearing strokes.   Etiology: Likely hypercoagulable 2/2 malignancy (colon cancer)    Recommendations:  - Stroke work up including TTE, Lipid profile and HbA1c  - Aspirin 81 mg and Plavix 75 mg daily   - Lipitor 40 mg daily  - Telemetry  - PT/OT    Will continue to follow. Please call us with any questions    Louie Garvin MD  Fellow, Vascular Neurology  Text Page    To page stroke neurology after hours through [facility], click here: [facility]  Choose \"On Call\" tab at top, then search dropdown box for \"Neurology Adult\"    "

## 2020-01-31 NOTE — ED NOTES
[facility]  ED Nurse Handoff Report    ED Chief complaint: Altered Mental Status      ED Diagnosis:   Final diagnoses:   Altered mental status, unspecified altered mental status type   Stroke-like symptoms   Dehydration   Malignant neoplasm of colon, unspecified part of colon (H)       Code Status: see epic  Allergies:   Allergies   Allergen Reactions     Diphenhydramine Anaphylaxis     Other reaction(s): Swelling, lips/tongue     Shellfish Allergy Nausea and Vomiting       Patient Story: pt hx of colon ca stage 3, last chemo was on Tuesday 2/28, since then pt has had higher glucose (an effect of the chemo) but also she has not been taking her metformin and she has had multiple falls, but was not injured during any of them and was able to continue walking per family. Granddaughter saw pt last night and states she was normal. This am granddaughter found pt to be extremely confused, hallucinating, c/o dizziness and pt was tremorous.   Focused Assessment:  Oriented only to self. Left side field cut and left sided UE contraction. Pt is very stiff/rigid and moving oddly, unable to sit still for long or follow direction well. Sating 86-90% on RA. Started on 3L O2, sating well. Pt given 1mg ativan to relax her rigidity and is now sleeping comfortably in bed    Treatments and/or interventions provided: ativan, 1L LR  Patient's response to treatments and/or interventions: resting    To be done/followed up on inpatient unit:  continued care, will do mri on floor    Does this patient have any cognitive concerns?: yes, only oriented to person    Activity level - Baseline/Home:  Stand with Assist, with walker  Activity Level - Current:   Unknown    Patient's Preferred language: English   Needed?: No    Isolation: None  Infection: Not Applicable  Bariatric?: No    Vital Signs:   Vitals:    01/31/20 1115 01/31/20 1200 01/31/20 1300 01/31/20 1400   BP: 115/68      Pulse:       Resp: 16 13 12 11   Temp:        TempSrc:       SpO2: 96%  99% 99%   Weight:       Height:           Cardiac Rhythm:     Was the PSS-3 completed:   Yes  What interventions are required if any?               Family Comments: granddaughter at bedside, very knowledgeable and helpful   OBS brochure/video discussed/provided to patient/family: N/A              Name of person given brochure if not patient: na              Relationship to patient: na    For the majority of the shift this patient's behavior was Green.   Behavioral interventions performed were none.    ED NURSE PHONE NUMBER: [phone]

## 2020-01-31 NOTE — ED PROVIDER NOTES
History     Chief Complaint:    Altered Mental Status    The history is provided by a relative (granddaughter).      Tressa García is a 89 year old female with a history of colon cancer, type 2 diabetes, hypertension, hyperlipidemia, and diastolic heart failure who arrived via EMS and presents with altered mental status. The patient's granddaughter reports that the patient received her first full dose of chemotherapy 2 days ago. The following morning at 0330, the patient fell when going to the bathroom. The patient fell again at 0830, but refused to come to the ED. She saw her oncologist yesterday and was given IV fluids. They deny any significant injuries, LOC, or pain secondary to her falls. No syncope. Her blood sugar was 279 and her temperature was normal. After returning home from that appointment, the patient seemed disoriented and refused to take her evening medications. This morning the patient woke up even more disoriented, shaky, lightheaded, dizzy, and weaker than usual. She has not drank or ate anything today. The granddaughter reports that her speech is slurred as well, but she is coherent. She is favoring her right side and states that she thinks she is having a stroke. The patient reports right knee pain and denies any injuries in the falls. She is not on any blood thinners. The patient does not regularly use supplemental oxygen at home, but has it if needed and she did use it after her second fall. Her granddaughter states that she last seemed normal around noon or 1300 yesterday at her appointment.     Allergies:  Diphenhydramine   Shellfish containing products     Medications:    Aspirin 81mg   Amaryl  Metformin   Amlodipine  Losartan  Simvastatin   Oxycodone    Lasix     Past Medical History:    Palliative care patient   SCC, left cheek   Type II DM   Osteopenia   Cough   Colon cancer  Osteoarthritis of right knee   Hyperlipidemia    Sarcoidosis   Hypertension   BPPV   Abdominal mass   Heart  "murmur   Diastolic heart failure     Past Surgical History:    Colonoscopy  HENRY and BSO   Appendectomy   Cholecystectomy    Trigger finger repair  Carpal tunnel repair     Family History:    Daughter - breast cancer, diabetes, pancreatic cancer   Mother - stomach cancer   Sister - bone and brain cancer, breast cancer, stomach cancer, arthritis     Social History:  The patient was accompanied to the ED by her granddaughter.  Smoking Status: Never  Smokeless Tobacco: No  Alcohol Use: Yes      Review of Systems   Unable to perform ROS: Mental status change   Musculoskeletal: Positive for arthralgias (right knee).   Neurological: Positive for dizziness, tremors, speech difficulty (slurred), weakness and light-headedness.   Psychiatric/Behavioral: Positive for confusion.       Physical Exam     Patient Vitals for the past 24 hrs:   BP Temp Temp src Pulse Resp SpO2 Height Weight   01/31/20 1000 -- -- -- -- -- 97 % -- --   01/31/20 0951 (!) 147/89 98.2  F (36.8  C) Oral 99 16 (!) 88 % 1.473 m (4' 10\") 61.7 kg (136 lb)     Physical Exam  General: Nontoxic. Appears frail.  Resting comfortably  Head:  Scalp, face, and head appear normal, atraumatic  Eyes:  Pupils are equal, round, and reactive to light.  Patient exhibits significant right-sided gaze preference and does not cross midline with either eye.  Patient has possible left visual field cuts however this may simply be due to her gaze preference.    Conjunctivae non-injected and sclerae white  ENT:    The external nose is normal    Pinnae are normal    The oropharynx is normal, mucous membranes moist    Uvula is in the midline  Neck:  Normal range of motion    There is no rigidity noted    Trachea is in the midline  CV:  Regular rate and rhythm     Normal S1/S2, no S3/S4    No murmur or rub. Radial pulses 2+ bilaterally  Resp:  Lungs are clear and equal bilaterally    There is no tachypnea    No increased work of breathing    No rales, wheezing, or rhonchi  GI:  Abdomen " is soft, no rigidity or guarding    No distension, or mass    No tenderness or rebound tenderness   MS:  Normal muscular tone.  Full range of motion of all joints without pain.  No definite bony tenderness on examination.  No external signs of trauma.    Symmetric motor strength    2+ pitting edema bilateral lower extremities below the knees.  Skin:  No rash or acute skin lesions noted  Neuro: Awake and alert oriented to person only.    Patient exhibits left-sided neglect with right side gaze preference and inability to move the eyes past midline.  Patient reports sensation of light touch in the left upper and left lower extremity however reports that she feels on the right side.  Sensation intact to light touch normally on the right side.  There is increased muscle tone in the left upper extremity patient keeps the left elbow flexed and the shoulder extended.  No pronator drift bilaterally.   strength 4 out of 5 but symmetric bilaterally.  5 out of 5 strength in the right lower extremity.  4-5 strength in the left lower extremity with some left leg drift.  Symmetric strength distally at the ankles with normal dorsiflexion and plantar flexion.  No speech changes.  Patient follows commands and answers questions although not always appropriately.  No obvious dysmetria on finger-to-nose testing however due to increased tone in the left upper extremity this is difficult to assess.  Patient exhibits occasional very low amplitude clonus or shaking in the left upper extremity.    Speech is normal and fluent    Moves all extremities spontaneously  Psych:  Normal affect.  Appropriate interactions.    Emergency Department Course     ECG (1:30:01):  Rate 87 bpm. PA interval 166. QRS duration 82. QT/QTc 424/510. P-R-T axes 39 -18 155. Sinus rhythm with premature atrial complexes. Low voltage QRS. Cannot rule out Anterior infarct, age undetermined. T wave abnormality, consider lateral ischemia. Prolonged QT. Abnormal ECG.  Nonspecific T-wave change. Interpreted at 1148 by Daniel Squires MD.    Imaging:  Radiology findings were communicated with the patient and family who voiced understanding of the findings.    CT Head w/o IV contrast:   1. 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  2. Cerebral atrophy with moderately extensive white matter changes which are most likely due to chronic small vessel ischemic disease.  3. No evidence for intracranial hemorrhage or any definite acute process, as per radiology.    CTA Head Neck w/ IV contrast:   1. Minimal atherosclerotic disease involving the carotid bifurcations and carotid siphon regions without stenosis.  2. No evidence for stenosis, dissection, thromboembolism, or aneurysm, as per radiology.    CT Head Perfusion w/ IV contrast:   Normal CT brain perfusion imaging, as per radiology.    Laboratory:  Laboratory findings were communicated with the patient and family who voiced understanding of the findings.    CBC: WBC 22.3 H o/w WNL (HGB 11.7, )  CMP: Glucose 276 H, BUN 38 H, Creatinine 1.24 H, GFR Estimate 38 L, Albumin 2.4 L, Protein total 6.0 L, ALKPHOS 153 H, AST 80 H o/w WNL  Lipase: 95  Lactic acid whole blood (1027): 3.1 H  Magnesium: 2.0  Blood culture: Pending    UA: slightly cloudy, yellow urine, protein albumin 10, leukocyte esterase small, yeast urine many, mucous present, hyaline casts 21 H o/w negative    Interventions:  1114: Ativan 1 mg IV  1147: aspirin suppository 300 mg Rectal  1151: lactated ringers BOLUS 1,000 mL 1 L IV    Emergency Department Course:  Past medical records, nursing notes, and vitals reviewed.    1007: I performed an exam of the patient as documented above.     1023: Code stroke called.    1026: I spoke with Dr. Palma from vascular neurology regarding the patient.     EKG obtained in the ED, see results above.     IV was inserted and blood was drawn for laboratory testing, results above.    The patient provided a urine sample here in the emergency  department. This was sent for laboratory testing, findings above.    The patient was sent for a head CT, a head and neck CTA, and a head perfusion CT while in the emergency department, results above.     1052: I rechecked the patient and discussed the results of her workup thus far.     1058: I spoke with neurology again regarding the patient.    1107: I spoke with Dr. Hwang from radiology regarding the patient's imaging results.    1143: I spoke with Dr. Palma from vascular neurology again.    1335: Patient rechecked and updated.    I personally reviewed the laboratory, EKG, and imaging results with the Patient and granddaughter and answered all related questions prior to admission.     Findings and plan explained to the Patient and granddaughter who consents to admission.     1355: Discussed the patient with Dr. Friend, who will admit the patient to a neuro bed for further monitoring, evaluation, and treatment.     Impression & Plan     CMS Diagnoses: The Lactic acid level is elevated due to dehydration, at this time there is no sign of severe sepsis or septic shock.    Medical Decision Making:  Tressa García is a 89 year old female who was recently diagnosed with malignancy of the colon on palliative chemotherapy and presents with change in mental status after recently receiving her third chemotherapy infusion. On my evaluation, the patient is hemodynamically stable and afebrile. She has evidence of significant neurologic abnormalities including left-sided neglect with right-sided gaze preference and inability to cross the eyes to the left. In addition, she has increased muscle tone in the left upper extremity, but no focal weakness. No facial droop. No slurred speech. Positive leg drift on the left. Given the findings, code stroke was called. A broad differential diagnosis was considering intracranial hemorrhage, intracranial mass/lesion, stroke, seizure, toxic metabolic or infectious encephalopathy among others.  CT, CTA, and CT perfusion of the head did not reveal any evidence of large stroke or large vessel occlusion amenable to intervention. Given last known normal was yesterday at noon, the patient is not a TPA candidate. In addition, she has some recent falls and possible head trauma, which would further make her higher risk for TPA. The patient was evaluated by stroke neurology in the emergency department. She was given a trial of Ativan for treatment of a potential seizure although it is unclear if this had a definite affect. EEG was obtained and the read is pending at this time. Neurology recommended on holding off on any anti-epileptic medications at this time as it is not clear yet what the diagnosis is. She will need further workup including MRI of the brain and consideration for other causes of encephalopathy. At this time she is afebrile and does not have any evidence of infection. The case was discussed with the hospitalist and the patient will be admitted for further evaluation, monitoring, and treatment. She remained stable during her entire ED course.    Diagnosis:    ICD-10-CM   1. Altered mental status, unspecified altered mental status type R41.82   2. Stroke-like symptoms R29.90     Disposition:  Admitted to neuro bed.    Scribe Disclosure:  Peyton CHAN, am serving as a scribe at 10:04 AM on 1/31/2020 to document services personally performed by Daniel Squires MD based on my observations and the provider's statements to me.     Peyton Clay  1/31/2020    EMERGENCY DEPARTMENT       Daniel Squires MD  01/31/20 1952

## 2020-01-31 NOTE — H&P
Admitted:     01/31/2020      PRIMARY CARE PHYSICIAN:  Olvin Keenan MD through [facility].      CHIEF COMPLAINT:  Altered mental status.      HISTORY OF PRESENT ILLNESS:  Tressa García is a 89-year-old female with a past medical history significant for stage III colon cancer, hypertension, hyperlipidemia and type 2 diabetes among other medical problems, who was brought to the emergency room today for evaluation of altered mental status.  History is obtained per discussions with the patient's granddaughter at bedside, as well as review of the records.      The patient has a history of stage III colon cancer for which she follows with Dr. Bruce of [facility].  She has met with Palliative Care services in the past through [facility].  Most recent visit per their service was in the beginning of 12/2019.  The focus of the visit was on adequate symptom management, and she was prescribed a combination of OxyContin and oxycodone for management of her pain.  More recently, she started on chemotherapy as directed per Dr. Bruce.  She most recently received a treatment with oxaliplatin on Tuesday (1/28).  Additionally, she takes Xeloda orally, which her daughter says she has been compliant with at home.  She initially tolerated her treatment earlier this week.  Her granddaughter has been here visiting from [place] to assist with her care.  She had been living independently in her apartment.  On Wednesday (1/29) early morning, both the patient's daughter and her granddaughter were staying with her noted that she had fallen and needed assistance to get up.  She was helped back into bed and a short time later, she again had a subsequent fall while trying to get out of bed.  She was alert during these periods of time.  As the day progressed, her condition improved, and she was able to eat and drink without difficulties thus, they deferred bringing her in for further evaluation.  On the morning of 1/30, her granddaughter  contacted Dr. Bruce's clinic, and she was brought in for further evaluation.  It was thought that she may have had some degree of dehydration following her recent chemo and that she was given IV fluids.  After her infusion, she returned home, and her granddaughter noted ongoing poor oral intake.  She did not want to take her medications last evening including her pain medication and her oral chemotherapy.  She otherwise went to bed without any concerns.      This morning, upon waking, her granddaughter noted the patient seemed to be out of it.  She describes the patient as staring out the window.  She began to state something is wrong.  I think I am having a spell.  My insides are shaking.  This was a change unusual for her.  Her granddaughter became concerned and ultimately called EMS.  She was brought to the emergency room for further evaluation.  In the emergency room, she was seen and evaluated by Dr. Squires.  She was mildly hypertensive but otherwise afebrile and hemodynamically stable.  She was placed on supplemental oxygen.  A code stroke was called, and she was seen by the Stroke Neuro Service.  She was observed to have a right gaze deviation and initially some increased tone in her left upper extremity with some left-sided hemineglect.  A stat head CT, CT perfusion and CTA were without acute abnormalities.  She was given 1 mg of IV Ativan due to concern for partial complex seizures, and her left upper extremity dystonia and improved.  A spot EEG was done in the emergency room that showed no findings of seizures.  They recommended an MRI of the brain to rule out any acute intracranial abnormality.      When I saw the patient after she received 1 mg of Ativan, she was sleeping quietly.  She did open her eyes and was able to follow simple commands but did not converse any further.  Her granddaughter relayed wishes for hospitalization for evaluation of these symptoms as they were worried it could be a  potential side effect of her recent chemotherapy.  She will be admitted to the hospital today for ongoing evaluation and care.  In the emergency room, she has been given IV fluids, a full-dose of rectal aspirin and 1 mg of IV Ativan.      PAST MEDICAL HISTORY:   1.  Colon cancer, reportedly stage III, involving the ascending colon.  She follows with Dr. Bruce of [facility].  She is currently undergoing palliative chemotherapy, both with IV infusion and oral chemotherapy agents.  She is scheduled to have a port placed in the upcoming week for continued chemo.  Additionally, she also follows with the Palliative Care Service through [facility] for symptom management including issues with pain.   2.  Hypertension.   3.  Hyperlipidemia.   4.  Type 2 diabetes.  Controlled on oral medications.   5.  Chronic lower extremity swelling.  Family reports this is a side effect of her chemotherapy treatments.   6.  Hyperlipidemia.   7.  Reported history of sarcoidosis.   8.  BPPV.   9.  Osteoarthritis.   10.  Obesity.      PAST SURGICAL HISTORY:   1.  Total abdominal hysterectomy and bilateral salpingo-oophorectomy.   2.  Carpal tunnel release bilaterally.   3.  Trigger finger release bilaterally.   4.  Cholecystectomy.   5.  Appendectomy.      SOCIAL HISTORY:  The patient is a lifelong nonsmoker.  She consumes alcohol rarely, mostly on holidays.  She lives at home independently and ambulates with a walker.  Her daughter lives locally and has been assisting with her care and more recently, her granddaughter is visiting from [place] assisting with her care.      FAMILY HISTORY:  Her mother had a history of stomach cancer.  She has a sister with a history of bone and brain cancer.  One daughter has a history of breast cancer.  Two daughters have a history of diabetes.      HOME MEDICATIONS:  Prior to Admission medications    Medication Sig Last Dose Taking? Auth Provider   capecitabine (XELODA) 500 MG tablet Take 1,000 mg by mouth 2  times daily For 14 days every 3 weeks per [facility] 1/30/2020 at PM Yes Reported, Patient   furosemide (LASIX) 20 MG tablet Take 20 mg by mouth every morning Past Week at Unknown time Yes Reported, Patient   metFORMIN (GLUCOPHAGE) 1000 MG tablet Take 1,000 mg by mouth every evening 1/30/2020 at PM Yes Reported, Patient   oxyCODONE (OXYCONTIN) 10 MG 12 hr tablet Take 10 mg by mouth every morning Pt takes 10 mg QAM and 20 mg QPM 1/30/2020 at AM Yes Reported, Patient   oxyCODONE (OXYCONTIN) 20 MG 12 hr tablet Take 20 mg by mouth every evening Pt takes 10 mg QAM and 20 mg QPM 1/30/2020 at PM Yes Reported, Patient   oxyCODONE (ROXICODONE) 5 MG tablet Take 5 mg by mouth every 4 hours as needed for breakthrough pain or severe pain PRN Yes Reported, Patient   aspirin 81 MG EC tablet Take 81 mg by mouth daily Unknown at Unknown time   Reported, Patient   OXALIPLATIN IV Inject 200 mg into the vein every 21 days 200 mg last given on 1/28/20 per [facility] 1/28/2020 at Unknown time   Reported, Patient          ALLERGIES:  BENADRYL CAUSES SWELLING OF THE LIPS AND TONGUE, AND SHELLFISH CAUSES NAUSEA AND VOMITING.      REVIEW OF SYSTEMS:  A full 10-point review of systems was discussed with the patient's daughter and negative unless otherwise stated per HPI.      PHYSICAL EXAMINATION:   VITAL SIGNS:  Temperature 98.2, pulse 67, respirations 10, blood pressure 114/59, O2 sat 99% on 3 liters nasal cannula.   GENERAL:  The patient is an elderly-appearing female lying quietly on the gurney.  She does rouse to name and is in no acute distress.   NEUROLOGIC:  She is unable to assess degree of orientation.  She opens her eyes to name and is able to follow basic commands but is not answering questions.     HEENT:  Pupils are equal, round and reactive to light and accommodation.  Extraocular movements are intact.  Mucous membranes moist.  There is no facial asymmetry.     NEUROLOGIC:  Strength is diminished in the left  upper extremity as compared to the right.  She is able to move both feet bilaterally but cannot lift either upper and lower extremity off the bed.   CARDIOVASCULAR:  Heart rate and rhythm regular.  No murmurs, gallops, rubs.  Pulses are +2 and symmetric in bilateral upper extremities.  There is +1 bilateral lower extremity edema to the mid shins.   RESPIRATORY:  Lungs are clear to auscultation bilaterally.  Free of rales or rhonchi.  No increased work of breathing or accessory muscle use.   ABDOMEN:  Obese, soft.  There is tenderness to palpation throughout the right side of the abdomen.  No guarding or rigidity.  Bowel sounds are positive throughout.   INTEGUMENTARY:  Warm and dry.  No rashes, jaundice or ecchymosis.      LABORATORY DATA AND IMAGING  DATA:    BMP shows sodium 133, potassium 4.2, creatinine 1.24, calcium 9.1, glucose 276.  LFTs showed AST 80, ALT 16, alkaline phosphatase 153, total bilirubin 1.2.  Albumin is 2.4, total protein 6.0, lipase 95, lactate 3.1.  CBC showed a white count of 22.3, hemoglobin 11.7 and platelet count of 220.      Urinalysis showed 5 WBCs with small leuk esterase, negative blood and nitrites.  There was yeast, squamous cells, mucus and hyaline casts.  Blood cultures x 2 are pending.  EKG showed a normal sinus rhythm with some nonspecific T-wave changes.      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  Head CT with perfusion was normal and showed no evidence of ischemia or infarct.  A CTA of the head and neck showed minimal atherosclerotic disease at the carotid bifurcations and carotid siphon without stenosis.  No other evidence of stenosis, dissection, thromboembolism or aneurysm.      MRI of the brain and a chest x-ray are pending.      ASSESSMENT AND PLAN:  Tressa García is a 89-year-old female with a past medical history of stage III colon cancer, hypertension, hyperlipidemia and type 2 diabetes among other medical  problems, who was brought to the emergency room this morning for evaluation of neurologic changes including altered mental status, left-sided hemineglect and dystonia in the left upper extremity.  Workup in the emergency room is concerning for possible stroke versus seizure.  She is being admitted to the hospital today for ongoing evaluation and care.   1.  Altered mental status with neurologic changes as described above including a right-sided gaze preference and left upper extremity dystonia.  A code stroke was called in the emergency room.  Initial head imaging including a stat head CT, CTA and CT perfusion scan were all negative for acute abnormalities.  She was seen by the Stroke Neuro Service, and the concern is for possible stroke versus seizure.  She was given 1 mg of Ativan, and her dystonia improved.  She is quite drowsy at present, thus limiting my exam.  Plan at this juncture is to admit her to the hospital.  Further evaluation with an MRI of the brain.  Await additional recommendations per the Stroke Neurology Service.  At this juncture, there was no clear plan to initiate AEDs.  A stat EEG was done in the emergency room, but the report of that is still pending.  We will await findings on MRI of the brain which has been ordered.  Continue neuro checks every 4 hours.  She was given a dose of rectal aspirin in the emergency room.  She appears quite sedated at present, and I do not feel that she is safe for oral intake.  Once her mentation improves after Ativan or Zoloft, I would consider a bedside swallow evaluation per bedside nursing.   2.  Stage III colon cancer.  Follows with Dr. Bruce of [facility] ([facility]  On chemotherapy with both IV and oral chemotherapy agents.  Takes Xeloda daily and most recently received oxaliplatin on 1/28/20.  Some concerns that her dystonia could have been precipitated by her chemotherapy.  She saw Dr. Bruce in followup just yesterday, on  1/30/20.   At that time, she was noted he needs some IV fluids for hydration.  She is on medications.  She follows with the Palliative Care through [facility] who assists with managing her pain medications including OxyContin and oxycodone.  At this juncture, she is not safe for oral intake; thus, her pain medications will need to be held.  Once her condition improves and she perks up, could consider resumption of oral pain regimen.  I have placed a consult to [facility], and their assistance with ongoing care this hospitalization is much appreciated.   3.  Hypertension.  It sounds as though she may not have been taking her medications regularly in recent days.  She was initially mildly hypertensive on presentation.  Her pressures subsequently improved after she was given Ativan.  As above, oral medications will be held for now until her mentation improves and she is safe for oral intake.   4.  Type 2 diabetes.  Typically manages with oral agents.  These will be held for the time being.  We will monitor Accu-Cheks and utilize sliding scale insulin as needed for now.   5.  Acute kidney injury.  Her creatinine today is elevated at 1.24.  Her baseline renal function seems includes a creatinine of around 0.7.  Suspect secondary to poor oral intake in recent days.  She will be given IV fluids.  She received a bolus in the emergency room.  Continue with NS at 100 mL an hour.   6.  Lactic acidosis.  I suspect although she does have an elevated white count (22.3) and lactic acid of 3.1, I have a low suspicion for acute infection.  Her urinalysis is bland.  A chest x-ray was ordered and is pending at this time, though she has had no recent reports of cough, fevers or shortness of breath.  I suspect this could be secondary to volume depletion in the setting of her recent chemotherapy and poor oral intake.  Continue IV hydration as above.  A repeat lactate has been ordered.   7.  Deep venous thrombosis prophylaxis:  PCDs.       CODE STATUS:  The patient is a DNR/DNI as outlined on paperwork provided by her granddaughter.      As I anticipate she will be here for greater than a 2-midnight stay to complete the workup and evaluation as above, she has been admitted under inpatient status.         MALICK SCHNEIDER DO             D: 2020   T: 2020   MT:       Name:     REINALDO ANDREWS   MRN:      [id number]        Account:      [id number]   :      1929        Admitted:     2020                   Document: [id number]       cc: Olvin Keenan DO

## 2020-01-31 NOTE — PROGRESS NOTES
I emailed this patients HCD to honoring choices for review and to enter into the chart.  I called and left a VM for them to review it as soon as they can. I updated the hospitalist and the ER staff.

## 2020-01-31 NOTE — CONSULTS
[facility]    Stroke Consult Note    Reason for Consult: Right gaze and left side weakness    Chief Complaint: Altered Mental Status      HPI  Tressa García is a 89 year old female with a history of colon cancer, type 2 diabetes, hypertension, hyperlipidemia, and diastolic heart failure who arrived via EMS and presents with altered mental status.     The patient's granddaughter reports that the patient received her first full dose of chemotherapy 2 days ago. The following morning at 0330, the patient fell when going to the bathroom. The patient fell again at 0830, but refused to come to the ED. She saw her oncologist yesterday and was given fluids. After returning home from that appointment, the patient seemed disoriented and refused to take her evening medications. This morning the patient woke up even more disoriented, shaky, lightheaded, dizzy, and weaker than usual. She has not drank or ate anything today. The granddaughter reports that her speech is slurred as well, but she is coherent. She is favoring her right side and states that she thinks she is having a spell. The patient reports right knee pain and denies any injuries in the falls. She is not on any blood thinners. The patient does not regularly use supplemental oxygen at home, but has it if needed and she did use it after her second fall. Her granddaughter states that she last seemed normal around 10pm last night.     Upon my assessment, her      Current Medications:    Aspirin 81mg   Amaryl  Metformin   Amlodipine  Losartan  Simvastatin   Oxycodone    Lasix        TPA Treatment   Not given due to outside the time window, stroke mimic: ?seizure.    Endovascular Treatment  Not initiated due to absence of proximal vessel occlusion    Impression  Right gaze deviation and left arm dystonia   Possible differentials are acute stroke vs seizure. CT head, CTA and perfusion are negative for any acute abnormalities. Her left arm dystonia did  "get improve with 1 mg Ativan. Unclear if gaze improved as she was very drowsy. She is not a candidate for any acute stroke intervention. Spot EEG preliminary read showing no EDs/Seizures. Would need MRI brain to rule out any acute IC abn.     Recommendations  - Neuro checks Q4  - Pending EEG report  - MRI brain w/wo contrast to rule out any acute IC abn.   - Continue PTA home medications  - Will follow       Patient Follow-up    - final recommendation pending work-up    Thank you for this consult. We will continue to follow.     Louie Garvin MD  Fellow, Vascular Neurology  Text Page    To page stroke neurology after hours through [facility], click here: [facility]  Choose \"On Call\" tab at top, then search dropdown box for \"Neurology Adult\"    ______________________________________________________    Past Medical History   Past Medical History:   Diagnosis Date     Cancer (H)     colon ca stage 3      Diabetes (H)      Past Surgical History   Past Surgical History:   Procedure Laterality Date     CHOLECYSTECTOMY       GYN SURGERY      hyst      Medications   Home Meds  Prior to Admission medications    Not on File     Past Medical History:    Palliative care patient   SCC, left cheek   Type II DM   Osteopenia   Cough   Colon cancer  Osteoarthritis of right knee   Hyperlipidemia    Sarcoidosis   Hypertension   BPPV   Abdominal mass   Heart murmur   Diastolic heart failure      Past Surgical History:    Colonoscopy  HENRY and BSO   Appendectomy   Cholecystectomy    Trigger finger repair  Carpal tunnel repair      Family History:    Daughter - breast cancer, diabetes, pancreatic cancer   Mother - stomach cancer   Sister - bone and brain cancer, breast cancer, stomach cancer, arthritis       Allergies   Allergies   Allergen Reactions     Diphenhydramine Anaphylaxis     Other reaction(s): Swelling, lips/tongue     Shellfish Allergy Nausea and Vomiting     Family History   No family history on file.  Social History   Social " History     Tobacco Use     Smoking status: Never Smoker     Smokeless tobacco: Never Used   Substance Use Topics     Alcohol use: Never     Frequency: Never     Drug use: Never       Review of Systems   The 10 point Review of Systems is negative other than noted in the HPI or here.        PHYSICAL EXAMINATION  Temp:  [98.2  F (36.8  C)] 98.2  F (36.8  C)  Pulse:  [99] 99  Heart Rate:  [68-98] 68  Resp:  [11-16] 11  BP: (115-147)/(68-89) 115/68  SpO2:  [88 %-99 %] 99 %     General:  patient lying in bed with oxygen on, awake but slightly confused, does follow commands    HEENT:  normocephalic/atraumatic  Cardio:  RRR  Pulmonary:  no respiratory distress  Abdomen:  soft  Extremities:  pitting edema present  Skin:  intact     Neurologic  Mental Status:  follows commands, speech clear and fluent, alert but disoriented  Cranial Nerves:  facial sensation intact and symmetric, facial movements symmetric, hearing not formally tested but intact to conversation, palate elevation symmetric and uvula midline, shoulder shrug strong bilaterally, tongue protrusion midline, right gaze deviation  Motor:  no abnormal movements, able to move all limbs spontaneously, no pronator drift, 5/5 right side extremities, left hand 4/5 and rigid at elblow and wrist left leg 4/5  Reflexes:  toes down-going  Sensory:  light touch sensation intact and symmetric throughout upper and lower extremities, pinprick sensation intact and symmetric  Coordination:  IRIS  Station/Gait:  deferred          Stroke Scales    NIHSS  Interval baseline (01/31/20 1329)   Interval Comments     1a. Level of Consciousness 0-->Alert, keenly responsive   1b. LOC Questions 1-->Answers one question correctly   1c. LOC Commands 0-->Performs both tasks correctly   2.   Best Gaze 2-->Forced deviation, or total gaze paresis not overcome by the oculocephalic maneuver   3.   Visual 2-->Complete hemianopia   4.   Facial Palsy 0-->Normal symmetrical movements   5a. Motor Arm, Left  1-->Drift, limb holds 90 (or 45) degrees, but drifts down before full 10 seconds, does not hit bed or other support   5b. Motor Arm, Right 0-->No drift, limb holds 90 (or 45) degrees for full 10 secs   6a. Motor Leg, Left 1-->Drift, leg falls by the end of the 5-sec period but does not hit bed   6b. Motor Leg, right 0-->No drift, leg holds 30 degree position for full 5 secs   7.   Limb Ataxia 0-->Absent   8.   Sensory 0-->Normal, no sensory loss   9.   Best Language 0-->No aphasia, normal   10. Dysarthria 0-->Normal   11. Extinction and Inattention  0-->No abnormality   Total 7 (01/31/20 1329)       Imaging  I personally reviewed all imaging; relevant findings per HPI.     Lab Results Data   CBC  Recent Labs   Lab 01/31/20  1009   WBC 22.3*   RBC 3.88   HGB 11.7   HCT 36.5        Basic Metabolic Panel    Recent Labs   Lab 01/31/20  1009      POTASSIUM 4.2   CHLORIDE 100   CO2 23   BUN 38*   CR 1.24*   *   VIPIN 9.1     Liver Panel  Recent Labs   Lab Test 01/31/20  1009   PROTTOTAL 6.0*   ALBUMIN 2.4*   BILITOTAL 1.2   ALKPHOS 153*   AST 80*   ALT 16     INRNo lab results found.   Lipid ProfileNo lab results found.  A1CNo lab results found.  Troponin Farrah results for input(s): TROPI in the last 168 hours.       Stroke Code / Stroke Consult Data Data   Stroke Code Data  (for stroke code without tele)  Stroke code activated 01/31/20   1024   First stroke provider response 01/31/20   1026   Last known normal 01/30/20   1000   Time of discovery   (or onset of symptoms) 01/31/20   0900   Head CT read by me 01/31/20   1045   Was stroke code de-escalated? Yes 01/31/20 1132  patient is outside emergent treatment time parameters

## 2020-01-31 NOTE — PHARMACY-ADMISSION MEDICATION HISTORY
Pharmacy Medication History  Admission medication history interview status for the 1/31/2020  admission is complete. See EPIC admission navigator for prior to admission medications      Medication history sources: Pt's granddaughter/mother, [facility] [place]  Medication history source reliability: Good  Adherence assessment: Good with chemo and pain meds/Poor with maintenance meds     Significant changes made to the medication list:  Updated med list per pt's granddaughter/mother and [facility]         Additional medication history information:   Pt's mother and granddaughter report pt's adherence to the following scheduled maintenance meds has been extremely poor: simvastatin, losartan, amlodipine, metformin, glimepiride, aspirin, and Restasis.     Oxaliplatin IV - 200 mg last given on 1/28/20 per [facility]   Capecitabine 1000 mg last given on 1/30/20 PM per pt's granddaughter        Medication reconciliation completed by provider prior to medication history? N/A     Time spent in this activity: 20 minutes      Prior to Admission medications    Medication Sig Last Dose Taking? Auth Provider   capecitabine (XELODA) 500 MG tablet Take 1,000 mg by mouth 2 times daily For 14 days every 3 weeks per [facility] 1/30/2020 at PM Yes Reported, Patient   furosemide (LASIX) 20 MG tablet Take 20 mg by mouth every morning Past Week at Unknown time Yes Reported, Patient   metFORMIN (GLUCOPHAGE) 1000 MG tablet Take 1,000 mg by mouth every evening 1/30/2020 at PM Yes Reported, Patient   oxyCODONE (OXYCONTIN) 10 MG 12 hr tablet Take 10 mg by mouth every morning Pt takes 10 mg QAM and 20 mg QPM 1/30/2020 at AM Yes Reported, Patient   oxyCODONE (OXYCONTIN) 20 MG 12 hr tablet Take 20 mg by mouth every evening Pt takes 10 mg QAM and 20 mg QPM 1/30/2020 at PM Yes Reported, Patient   oxyCODONE (ROXICODONE) 5 MG tablet Take 5 mg by mouth every 4 hours as needed for breakthrough pain or severe pain PRN Yes  Reported, Patient   amLODIPine (NORVASC) 2.5 MG tablet Take 2.5 mg by mouth every evening Unknown at Unknown time  Reported, Patient   aspirin 81 MG EC tablet Take 81 mg by mouth daily Unknown at Unknown time  Reported, Patient   cycloSPORINE (RESTASIS) 0.05 % ophthalmic emulsion Place 1 drop into both eyes 2 times daily Unknown at Unknown time  Reported, Patient   glimepiride (AMARYL) 2 MG tablet Take 2 mg by mouth every evening Unknown at Unknown time  Reported, Patient   losartan (COZAAR) 25 MG tablet Take 25 mg by mouth every evening Unknown at Unknown time  Reported, Patient   OXALIPLATIN IV Inject 200 mg into the vein every 21 days 200 mg last given on 1/28/20 per [facility] 1/28/2020 at Unknown time  Reported, Patient   simvastatin (ZOCOR) 10 MG tablet Take 10 mg by mouth At Bedtime Unknown at Unknown time  Reported, Patient

## 2020-01-31 NOTE — PROGRESS NOTES
EEG [id number] 1 HR Video completed.   No Activation duehealth and age. Ordered by Dr. Garcia.  Video consent from daughter.

## 2020-01-31 NOTE — ED NOTES
Bed: ED10  Expected date:   Expected time:   Means of arrival:   Comments:  725  89 F ams/confusion/multiple falls over several days  0940

## 2020-01-31 NOTE — PHARMACY-ADMISSION MEDICATION HISTORY
Pharmacy Medication History  Admission medication history interview status for the 1/31/2020  admission is complete. See EPIC admission navigator for prior to admission medications     Medication history sources: Pt's granddaughter, [facility] [place]  Medication history source reliability: Good  Adherence assessment: Good    Significant changes made to the medication list:  Updated med list per pt's granddaughter and [facility]       Additional medication history information:   Pt's granddaughter believes pt is using aspirin but unsure of last dose   Oxaliplatin IV - 200 mg last given on 1/28/20 per [facility]   Capecitabine 1000 mg last given on 1/30/20 PM per pt's granddaughter   Furosemide use has been infrequent this week per pt's granddaughter     Medication reconciliation completed by provider prior to medication history? N/A    Time spent in this activity: 20 minutes      Prior to Admission medications    Medication Sig Last Dose Taking? Auth Provider   capecitabine (XELODA) 500 MG tablet Take 1,000 mg by mouth 2 times daily For 14 days every 3 weeks per [facility] 1/30/2020 at PM Yes Reported, Patient   furosemide (LASIX) 20 MG tablet Take 20 mg by mouth every morning Past Week at Unknown time Yes Reported, Patient   metFORMIN (GLUCOPHAGE) 1000 MG tablet Take 1,000 mg by mouth every evening 1/30/2020 at PM Yes Reported, Patient   oxyCODONE (OXYCONTIN) 10 MG 12 hr tablet Take 10 mg by mouth every morning Pt takes 10 mg QAM and 20 mg QPM 1/30/2020 at AM Yes Reported, Patient   oxyCODONE (OXYCONTIN) 20 MG 12 hr tablet Take 20 mg by mouth every evening Pt takes 10 mg QAM and 20 mg QPM 1/30/2020 at PM Yes Reported, Patient   oxyCODONE (ROXICODONE) 5 MG tablet Take 5 mg by mouth every 4 hours as needed for breakthrough pain or severe pain PRN Yes Reported, Patient   aspirin 81 MG EC tablet Take 81 mg by mouth daily Unknown at Unknown time  Reported, Patient   OXALIPLATIN IV Inject  200 mg into the vein every 21 days 200 mg last given on 1/28/20 per [facility] 1/28/2020 at Unknown time  Reported, Patient

## 2020-02-01 NOTE — PLAN OF CARE
Discharge Planner SLP   Patient plan for discharge: Did not state  Current status: SLP: Limited evaluation complete due to pt's level of alertness. Family reported no dysphagia symptoms, however, very limited intake that last month. Pt was easily aroused and stated name and hospital. Pt did not allow aggressive oral cares and did not follow commands for oral motor exam. Ice chip trialed with prolonged mastication and delayed initiation. Pharyngeal swallow appeared WFL and no overt s/sx of aspiration.  Pt accepted very small sips of thin liquids with anterior spillage, but no s/sx of aspiration. Pt not appropriate for puree solids.     Granddaughter agreed with recommended: cautiously starting clear liquid, thin liquids with encouraging pt to feed herself with assist as needed. Hold PO if overt s/sx of aspiration. Crush necessary meds only.     Barriers to return to prior living situation: Dysphagia; medical status  Recommendations for discharge: TCU  Rationale for recommendations: If goals of care remain restorative, will continue to follow daily for swallowing goals.        Entered by: Lizet Charles 02/01/2020 2:49 PM

## 2020-02-01 NOTE — PROGRESS NOTES
"  [facility]    Stroke Progress Note    Interval Events  Patient was confused and agitated throughout the night. MRI brain revealed multiple cerebral infarcts. Has been afebrile. Blood cultures are negative so far. Blood sugars running in 200s.     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 Will rule out other possible etiologies- cardiac source of embolism and endocarditis )    Recommendations:  - Stroke work up including TTE complete  - Will follow blood cultures and leukocytosis  - Aspirin 81 mg and Plavix 75 mg daily for now  - Start Subcutaneous heparin or Lovenox ppx dose for now  - Lipitor 40 mg daily  - Telemetry  - PT/OT  - Oncology to see and Goals of care discussion      Will continue to follow. Please call us with any questions     Louie Garvin MD  Fellow, Vascular Neurology  Text Page     To page stroke neurology after hours through [facility], click here: [facility]  Choose \"On Call\" tab at top, then search dropdown box for \"Neurology Adult\"             ______________________________________________________    Medications   Home Meds  Prior to Admission medications    Medication Sig Start Date End Date Taking? Authorizing Provider   capecitabine (XELODA) 500 MG tablet Take 1,000 mg by mouth 2 times daily For 14 days every 3 weeks per [facility]   Yes Reported, Patient   furosemide (LASIX) 20 MG tablet Take 20 mg by mouth every morning   Yes Reported, Patient   metFORMIN (GLUCOPHAGE) 1000 MG tablet Take 1,000 mg by mouth every evening   Yes Reported, Patient   oxyCODONE (OXYCONTIN) 10 MG 12 hr tablet Take 10 mg by mouth every morning Pt takes 10 mg QAM and 20 mg QPM   Yes Reported, Patient   oxyCODONE (OXYCONTIN) 20 MG 12 hr tablet Take 20 mg by mouth every evening Pt takes 10 mg QAM and 20 mg QPM   Yes Reported, Patient   oxyCODONE (ROXICODONE) 5 MG tablet Take 5 mg by mouth every 4 hours as needed for breakthrough pain or severe " pain   Yes Reported, Patient   amLODIPine (NORVASC) 2.5 MG tablet Take 2.5 mg by mouth every evening    Reported, Patient   aspirin 81 MG EC tablet Take 81 mg by mouth daily    Reported, Patient   cycloSPORINE (RESTASIS) 0.05 % ophthalmic emulsion Place 1 drop into both eyes 2 times daily    Reported, Patient   glimepiride (AMARYL) 2 MG tablet Take 2 mg by mouth every evening    Reported, Patient   losartan (COZAAR) 25 MG tablet Take 25 mg by mouth every evening    Reported, Patient   OXALIPLATIN IV Inject 200 mg into the vein every 21 days 200 mg last given on 1/28/20 per [facility]    Reported, Patient   simvastatin (ZOCOR) 10 MG tablet Take 10 mg by mouth At Bedtime    Reported, Patient       Scheduled Meds    insulin aspart  1-12 Units Subcutaneous Q4H     simvastatin  20 mg Oral QPM     sodium chloride (PF)  3 mL Intracatheter Q8H       Infusion Meds    sodium chloride 100 mL/hr at 01/31/20 1808       PRN Meds  acetaminophen, acetaminophen, bisacodyl, glucose **OR** dextrose **OR** glucagon, HYDROmorphone, lidocaine 4%, lidocaine (buffered or not buffered), LORazepam, melatonin, naloxone, ondansetron **OR** ondansetron, polyethylene glycol, potassium chloride, potassium chloride with lidocaine, potassium chloride, potassium chloride, prochlorperazine **OR** prochlorperazine **OR** prochlorperazine, senna-docusate **OR** senna-docusate, sodium chloride (PF)       PHYSICAL EXAMINATION  Temp:  [97.2  F (36.2  C)-97.8  F (36.6  C)] 97.2  F (36.2  C)  Heart Rate:  [58-98] 75  Resp:  [10-18] 18  BP: ()/(37-72) 123/63  SpO2:  [95 %-100 %] 96 %     Neurologic  Mental Status: Confused/disoriented but follows commands  Cranial Nerves:  facial sensation intact and symmetric, facial movements symmetric, hearing not formally tested but intact to conversation, palate elevation symmetric and uvula midline, shoulder shrug strong bilaterally, tongue protrusion midline, right gaze deviation  Motor: Left arm  rigidity resolved, no abnormal movements, able to move all limbs spontaneously, no pronator drift, 5/5 right side extremities, left hand 4/5, left leg 4/5  Reflexes:  toes down-going  Sensory:  light touch sensation intact and symmetric throughout upper and lower extremities  Coordination:  IRIS  Station/Gait:  deferred        Stroke Scales    NIHSS  Interval Day 2 (02/01/20)   Interval Comments     1a. Level of Consciousness 1-->Not alert, but arousable by minor stimulation to obey, answer, or respond   1b. LOC Questions 1-->Answers one question correctly   1c. LOC Commands 0-->Performs both tasks correctly   2.   Best Gaze 1-->Partial gaze palsy, gaze is abnormal in one or both eyes, but forced deviation or total gaze paresis is not present   3.   Visual 1-->Partial hemianopia   4.   Facial Palsy 0-->Normal symmetrical movements   5a. Motor Arm, Left 1-->drift   5b. Motor Arm, Right 0-->No drift, limb holds 90 (or 45) degrees for full 10 secs   6a. Motor Leg, Left 0-->No drift, leg holds 30 degree position for full 5 secs   6b. Motor Leg, right 0-->No drift, leg holds 30 degree position for full 5 secs   7.   Limb Ataxia 0-->Absent   8.   Sensory 0-->Normal, no sensory loss   9.   Best Language 0-->No aphasia, normal   10. Dysarthria 0-->Normal   11. Extinction and Inattention  0-->No abnormality   Total 5  (02/01/20)       Imaging  I personally reviewed all imaging; relevant findings per HPI.     Lab Results Data   CBC  Recent Labs   Lab 01/31/20  1009   WBC 22.3*   RBC 3.88   HGB 11.7   HCT 36.5        Basic Metabolic Panel    Recent Labs   Lab 01/31/20  1009      POTASSIUM 4.2   CHLORIDE 100   CO2 23   BUN 38*   CR 1.24*   *   VIPIN 9.1     Liver Panel  Recent Labs   Lab Test 01/31/20  1009   PROTTOTAL 6.0*   ALBUMIN 2.4*   BILITOTAL 1.2   ALKPHOS 153*   AST 80*   ALT 16     INRNo lab results found.   Lipid ProfileNo lab results found.  A1C  Recent Labs   Lab Test 01/31/20  1009   A1C 8.6*      Troponin Farrah results for input(s): TROPI in the last 168 hours.

## 2020-02-01 NOTE — PROGRESS NOTES
"SPIRITUAL HEALTH SERVICES Progress Note  [facility] [place]    Visit per pt family's request. Pt sleeping, pt's daughter, Lisa, and granddaughter, Carlo, awake and glad for visit. Pt's granddaughter voiced that \"she was glad pt was finally sleeping, was up fighting all night\". Carlo noted that she and Lisa had only a few hours of sleep, and are hoping to find time to shower while pt rests. Carlo is in-town from out of state and will be leaving either Monday or Tuesday. SH and pt's family chatted about feelings of having to wait and worry, and gladness at the peace pt seemed to finally be feeling. Pt's family is waiting for results before they decide the next steps of comfort care/hospice. SH oriented pt's family to  services available and explained that  is also for any family or friends.  offered emotional and spiritual support.  remains available for any further requests.       Iza Jasso   Intern  "

## 2020-02-01 NOTE — PROGRESS NOTES
Hospitalist Rik Cover  2/1/2020    Notified that patient is restless and in pain. Chart reviewed and discussed with bedside RN. Patient still NPO. Given her high opioid tolerance and cannot take her usual pain medicines, ordered dilaudid 0.3-0.5 mg IV q 3 hours. Can titrate up if not too sedating.     Staci Friend MD

## 2020-02-01 NOTE — PROGRESS NOTES
Care Transition Initial Assessment - SW     Met with: Patient and Family( daughter Lisa and granddaughter Carlo)  Active Problems:    Altered mental status       DATA  Lives With: alone      Quality of Family Relationships: helpful, involved, supportive  Description of Support System: Supportive, Involved  Who is your support system?: Children, Other (specify)(Homemaker, Care Coordinator)  Identified issues/concerns regarding health management:   Quality of Family Relationships: helpful, involved, supportive       SW met with patients daughter and granddaughter to introduce self/role and provide resources on hospice. Patient's granddaughter reports that she recently returned from out of town to assist patient. Patient lives em[place] in an apartment and was independent with ADls, with some assistance from daughter who lives about 5 miles away. Patient connected connected to Dax Care Coordinator from [facility] ([phone]) Patient's daughter reports that patients family would like to get in touch with [facility] for informational meeting . SW provided resources  (SNF list and Hospice residential) to patients daughter and granddaughter. SW contacted [facility] and requested information meeting. Richelle [phone] at [facility] reports meeting set with family for 2/2 between 1-1:30 pm. SW to continue to follow patient and assist with discharge as needed.      ASSESSMENT  Cognitive Status: Unable to assess   Concerns to be addressed: Hospice .     PLAN  Financial costs for the patient includes Yes .  Patient given options and choices for discharge Yes .  Patient/family is agreeable to the plan?  Yes:   Transportation/person available to transport on day of discharge  is TBD  Patient Goals and Preferences: TBD.  Patient anticipates discharging to:  TBD .

## 2020-02-01 NOTE — CONSULTS
[facility] Consultation      Tressa García MRN# [id number]   YOB: 1929 Age: 89 year old   Date of Admission: 1/31/2020  Requesting physician: Dr. Friend  Reason for consult: Colon cancer on chemo with AMS           Assessment and Plan:   Colon cancer    -follows with Dr. Bruce    -colonoscopy - partially obstructing tumor at the IC valve and another one in the ascending colon    -PET/CT showed no distant disease but with large mesenteric mass    -not felt to be candidate for upfront surgical resection     -dMMR and as such not a candidate for single agent Xeloda     -received 3 cycles of XELOX, last one on 1/28/2020    -doubt will remain appropriate candidate for further systemic treatments    -need discussion of goals of care     Multiple bilateral cortical ischemic infracts    -reviewed MRI brain 1/31/2020    -noted plan for DAPT/statin    -echo performed and results pending     -neuro following     Leukocytosis    -likely reactive     -did not receive neulasta following chemo     -CXR showing LLL opacity     -suggest empiric treatment for pneumonia    -blood cultures so far showing no growthcontinue to trend             DVT prophylaxis: heparin s/c bid               DNI/DNR    We will continue to follow her with you.    Angel Garcia MD             Chief Complaint:   Altered Mental Status           History of Present Illness:   Tressa García is a 89-year-old lady admitted to the hospital for evaluation of altered mental status.  She is under the care of my colleague Dr. Bruce for treatment of her locally advanced colon cancer.  She is currently undergoing treatment with XELOX, last received on 1/28/2020.    She apparently had a fall on 1/29/2020.  This was attributed to poor oral intake and weakness.  She was given IV fluids in the oncology clinic on 1/30/2020.  However the patient was noted to have developed altered mental status beginning 1/31/2020.  The initial concern for the  "seizure was a stroke.  MRI brain demonstrated multiple bilateral cortical ischemic infarcts.         Physical Exam:   Vitals were reviewed  Blood pressure (!) 143/85, pulse 99, temperature 97.2  F (36.2  C), temperature source Oral, resp. rate 16, height 1.473 m (4' 10\"), weight 61.7 kg (136 lb), SpO2 100 %.  Temperatures:  Current - Temp: 97.2  F (36.2  C); Max - Temp  Av.4  F (36.3  C)  Min: 97.2  F (36.2  C)  Max: 97.8  F (36.6  C)  Respiration range: Resp  Avg: 15.8  Min: 10  Max: 18  Pulse range: No data recorded  Blood pressure range: Systolic (24hrs), Av , Min:66 , Max:143   ; Diastolic (24hrs), Av, Min:37, Max:85    Pulse oximetry range: SpO2  Av.8 %  Min: 95 %  Max: 100 %    Intake/Output Summary (Last 24 hours) at 2020 1410  Last data filed at 2020 0646  Gross per 24 hour   Intake 2500 ml   Output 400 ml   Net 2100 ml       GENERAL: No acute distress.  SKIN: No rashes or jaundice.  HEENT: Normocephalic, atraumatic. Eyes anicteric. Oropharynx is clear.  LYMPH: No palpable lymphadenopathy in the cervical, supraclavicular, axillary, or inguinal regions.  HEART: Regular rate and rhythm with no murmurs.  LUNGS: occasional rhonchi  ABDOMEN: Soft, nontender, nondistended with no palpable hepatosplenomegaly.  EXTREMITIES: No clubbing, cyanosis, or edema.  MUSCULOSKELETAL: No pain to percussion over entire spine.  MENTAL: Alert and oriented to person, place, and time.  NEURO: right gaze and left sided weakness            Past Medical History:   I have reviewed this patient's past medical history  Past Medical History:   Diagnosis Date     Cancer (H)     colon ca stage 3      Diabetes (H)              Past Surgical History:   I have reviewed this patient's past surgical history  Past Surgical History:   Procedure Laterality Date     CHOLECYSTECTOMY       GYN SURGERY      hyst                Social History:   I have reviewed this patient's social history  Social History     Tobacco Use     " Smoking status: Never Smoker     Smokeless tobacco: Never Used   Substance Use Topics     Alcohol use: Never     Frequency: Never             Family History:   I have reviewed this patient's family history  No family history on file.          Allergies:     Allergies   Allergen Reactions     Diphenhydramine Anaphylaxis     Other reaction(s): Swelling, lips/tongue     Shellfish Allergy Nausea and Vomiting             Medications:   I have reviewed this patient's current medications  Medications Prior to Admission   Medication Sig Dispense Refill Last Dose     capecitabine (XELODA) 500 MG tablet Take 1,000 mg by mouth 2 times daily For 14 days every 3 weeks per [facility]   1/30/2020 at PM     furosemide (LASIX) 20 MG tablet Take 20 mg by mouth every morning   Past Week at Unknown time     metFORMIN (GLUCOPHAGE) 1000 MG tablet Take 1,000 mg by mouth every evening   1/30/2020 at PM     oxyCODONE (OXYCONTIN) 10 MG 12 hr tablet Take 10 mg by mouth every morning Pt takes 10 mg QAM and 20 mg QPM   1/30/2020 at AM     oxyCODONE (OXYCONTIN) 20 MG 12 hr tablet Take 20 mg by mouth every evening Pt takes 10 mg QAM and 20 mg QPM   1/30/2020 at PM     oxyCODONE (ROXICODONE) 5 MG tablet Take 5 mg by mouth every 4 hours as needed for breakthrough pain or severe pain   PRN     amLODIPine (NORVASC) 2.5 MG tablet Take 2.5 mg by mouth every evening   Unknown at Unknown time     aspirin 81 MG EC tablet Take 81 mg by mouth daily   Unknown at Unknown time     cycloSPORINE (RESTASIS) 0.05 % ophthalmic emulsion Place 1 drop into both eyes 2 times daily   Unknown at Unknown time     glimepiride (AMARYL) 2 MG tablet Take 2 mg by mouth every evening   Unknown at Unknown time     losartan (COZAAR) 25 MG tablet Take 25 mg by mouth every evening   Unknown at Unknown time     OXALIPLATIN IV Inject 200 mg into the vein every 21 days 200 mg last given on 1/28/20 per [facility]   1/28/2020 at Unknown time     simvastatin (ZOCOR) 10 MG  tablet Take 10 mg by mouth At Bedtime   Unknown at Unknown time     Current Facility-Administered Medications Ordered in Epic   Medication Dose Route Frequency Last Rate Last Dose     acetaminophen (TYLENOL) Suppository 650 mg  650 mg Rectal Q4H PRN   650 mg at 02/01/20 0102     acetaminophen (TYLENOL) tablet 650 mg  650 mg Oral Q4H PRN         aspirin (ASA) chewable tablet 81 mg  81 mg Oral Daily         aspirin (ASA) Suppository 300 mg  300 mg Rectal Daily   300 mg at 02/01/20 1401     bisacodyl (DULCOLAX) Suppository 10 mg  10 mg Rectal Daily PRN         glucose gel 15-30 g  15-30 g Oral Q15 Min PRN        Or     dextrose 50 % injection 25-50 mL  25-50 mL Intravenous Q15 Min PRN        Or     glucagon injection 1 mg  1 mg Subcutaneous Q15 Min PRN         heparin ANTICOAGULANT injection 5,000 Units  5,000 Units Subcutaneous Q12H   5,000 Units at 02/01/20 1206     HYDROmorphone (PF) (DILAUDID) injection 0.3-0.5 mg  0.3-0.5 mg Intravenous Q3H PRN   0.3 mg at 02/01/20 1206     insulin aspart (NovoLOG) inj (RAPID ACTING)  1-12 Units Subcutaneous Q4H   4 Units at 02/01/20 1216     lidocaine (LMX4) cream   Topical Q1H PRN         lidocaine 1 % 0.1-1 mL  0.1-1 mL Other Q1H PRN         LORazepam (ATIVAN) injection 2 mg  2 mg Intravenous Q3 Min PRN         melatonin tablet 1 mg  1 mg Oral At Bedtime PRN         naloxone (NARCAN) injection 0.1-0.4 mg  0.1-0.4 mg Intravenous Q2 Min PRN         ondansetron (ZOFRAN-ODT) ODT tab 4 mg  4 mg Oral Q6H PRN        Or     ondansetron (ZOFRAN) injection 4 mg  4 mg Intravenous Q6H PRN         polyethylene glycol (MIRALAX/GLYCOLAX) Packet 17 g  17 g Oral Daily PRN         potassium chloride (KLOR-CON) Packet 20-40 mEq  20-40 mEq Oral or Feeding Tube Q2H PRN         potassium chloride 10 mEq in 100 mL intermittent infusion with 10 mg lidocaine  10 mEq Intravenous Q1H PRN         potassium chloride 10 mEq in 100 mL sterile water intermittent infusion (premix)  10 mEq Intravenous Q1H PRN          potassium chloride ER (K-DUR/KLOR-CON M) CR tablet 20-40 mEq  20-40 mEq Oral Q2H PRN         prochlorperazine (COMPAZINE) injection 5 mg  5 mg Intravenous Q6H PRN        Or     prochlorperazine (COMPAZINE) tablet 5 mg  5 mg Oral Q6H PRN        Or     prochlorperazine (COMPAZINE) Suppository 12.5 mg  12.5 mg Rectal Q12H PRN         senna-docusate (SENOKOT-S/PERICOLACE) 8.6-50 MG per tablet 1 tablet  1 tablet Oral BID PRN        Or     senna-docusate (SENOKOT-S/PERICOLACE) 8.6-50 MG per tablet 2 tablet  2 tablet Oral BID PRN         simvastatin (ZOCOR) tablet 20 mg  20 mg Oral QPM         sodium chloride (PF) 0.9% PF flush 3 mL  3 mL Intracatheter q1 min prn         sodium chloride (PF) 0.9% PF flush 3 mL  3 mL Intracatheter Q8H   3 mL at 02/01/20 0815     sodium chloride 0.9% infusion   Intravenous Continuous 100 mL/hr at 02/01/20 1100       No current [facility]-ordered outpatient medications on file.             Review of Systems:   The 10 point Review of Systems is negative other than noted in the HPI.            Data:   All laboratory data reviewed  Results for orders placed or performed during the hospital encounter of 01/31/20 (from the past 24 hour(s))   Chest XR,  PA & LAT    Narrative    CHEST TWO VIEW   1/31/2020 3:34 PM     HISTORY: Hypoxia.    COMPARISON: None.      Impression    IMPRESSION: Bilateral vascular congestion and pulmonary edema pattern.  Borderline cardiomegaly. Correlate with CHF. Patchy opacity at the  left lower lobe region that may be atelectasis or focal airspace  disease.    PETERSON MAE MD   MR Brain w/o & w Contrast    Narrative    MRI BRAIN WITHOUT AND WITH CONTRAST  1/31/2020 4:24 PM     HISTORY: Focal neurologic deficit, greater than 6 hours, stroke  suspected.    TECHNIQUE: Multiplanar, multisequence MRI of the brain without and  with 6 mL Gadavist     COMPARISON: Head CT from earlier the same day.     FINDINGS: Images degraded by motion artifact, particularly the  axial  susceptibility weighted image.    Multiple areas of restricted diffusion within both cerebral  hemispheres compatible with acute infarcts. There are small acute  cortical infarcts within bilateral frontal, parietal, and occipital  lobes. The largest area of infarct is in the right occipital lobe  measuring approximately 1.9 x 1.6 cm. No definite large hemorrhagic  transformation of infarct although evaluation is limited given the  motion artifact. Moderate T2 hyperintensity and associated regional  edema in the areas of infarct without significant midline shift or  herniation.    Marked diffuse parenchymal volume loss. Chronic-appearing infarct in  the right basal ganglia. Marked periventricular white matter T2  hyperintensities are nonspecific, but likely related to chronic  microvascular ischemic disease. Ventricular size is unchanged without  evidence of hydrocephalus.     There is no abnormal intracranial enhancement.     1.6 cm T1 isointense, T2 hyperintense mass incompletely evaluated in  the right parotid gland.      Impression    IMPRESSION:    1. Multiple bilateral cortical infarcts, the largest infarct is within  the right occipital lobe. Mild regional edema in the areas of infarct  without significant midline shift or herniation. No obvious large  parenchymal hemorrhage although evaluation is slightly limited given  motion artifact.  2. Marked diffuse parenchymal volume loss and white matter changes  likely due to chronic microvascular ischemic disease.  3. Chronic-appearing infarct in the right basal ganglia.  4. 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      BLAIR KAUR MD   Glucose by meter   Result Value Ref Range    Glucose 242 (H) 70 - 99 mg/dL   Glucose by meter   Result Value Ref Range    Glucose 210 (H) 70 - 99 mg/dL   Glucose by meter   Result Value Ref Range    Glucose 215 (H) 70 - 99 mg/dL   Glucose by meter   Result Value Ref Range    Glucose  225 (H) 70 - 99 mg/dL   Lactic acid level STAT   Result Value Ref Range    Lactate for Sepsis Protocol 2.3 (H) 0.7 - 2.0 mmol/L   Glucose by meter   Result Value Ref Range    Glucose 240 (H) 70 - 99 mg/dL   Glucose by meter   Result Value Ref Range    Glucose 209 (H) 70 - 99 mg/dL   Glucose by meter   Result Value Ref Range    Glucose 217 (H) 70 - 99 mg/dL

## 2020-02-01 NOTE — PROGRESS NOTES
Hospitalist Update    Met with patient, her daughter and granddaughter at bedside this evening. Reviewed MRI findings of acute bilateral strokes. Reviewed neurology recommendations for DAPT and additional workup. Reviewed poor prognosis for recovery in the setting of active malignancy and that she is unlikely going to be a candidate for ongoing chemo.     Family accepting of this news. Unclear how much patient comprehends.     Family requested SW consult to begin conversations of hospice and what that would look like, though not formally transitioning to hospice/comfort cares yet. Family declining additional stroke evals at this time.  Encouraged them to continue to talk with specialists (neuro, oncology) in AM.     SW consult for hospice placed. Family also planning to talk with pall care through [facility] in AM (where she is an established patient).    Alisa Friend,   Internal Medicine - Hospitalist  1/31/2020  9:10 PM

## 2020-02-01 NOTE — PROGRESS NOTES
[facility]    Hospitalist Progress Note    Assessment & Plan   Tressa García is a 89-year-old female with a past medical history of stage III colon cancer, hypertension, hyperlipidemia and type 2 diabetes among other medical problems, who was brought to the emergency room this morning for evaluation of neurologic changes including altered mental status, left-sided hemineglect and dystonia in the left upper extremity.  Workup in the emergency room is concerning for possible stroke versus seizure.  She is being admitted to the hospital today for ongoing evaluation and care.     1.  Altered mental status with neurologic changes as described above including a right-sided gaze preference and left upper extremity dystonia.  A code stroke was called in the emergency room.  Initial head imaging including a stat head CT, CTA and CT perfusion scan were all negative for acute abnormalities. She was seen by the Stroke Neuro Service, and the concern is for possible stroke versus seizure.  She was given 1 mg of Ativan, and her dystonia improved.  She is quite drowsy at present, thus limiting my exam.  Plan at this juncture is to admit her to the hospital.  Further evaluation with an MRI of the brain. Await additional recommendations per the Stroke Neurology Service.  At this juncture, there was no clear plan to initiate AEDs.  A stat EEG was done in the emergency room, but the report of that is still pending.  We will await findings on MRI of the brain which has been ordered.  Continue neuro checks every 4 hours.  She was given a dose of rectal aspirin in the emergency room.  She appears quite sedated at present, and I do not feel that she is safe for oral intake.  Once her mentation improves after Ativan or Zoloft, I would consider a bedside swallow evaluation per bedside nursing.   - Reviewed MRI findings of acute bilateral strokes. Reviewed neurology recommendations for DAPT and additional workup. Reviewed  poor prognosis for recovery in the setting of active malignancy and that she is unlikely going to be a candidate for ongoing chemo. Family requested SW consult to begin conversations of hospice and what that would look like, though not formally transitioning to hospice/comfort cares yet. Family declining additional stroke evals at this time.  Encouraged them to continue to talk with specialists (neuro, oncology) in AM.     2.  Stage III colon cancer.  Follows with Dr. Bruce of [facility] ([facility]).  On chemotherapy with both IV and oral chemotherapy agents. Takes Xeloda daily and most recently received oxaliplatin on 1/28/20.  Some concerns that her dystonia could have been precipitated by her chemotherapy.  She saw Dr. Bruce in followup just yesterday, on 1/30/20.   At that time, she was noted he needs some IV fluids for hydration.  She is on medications. She follows with the Palliative Care through [facility] who assists with managing her pain medications including OxyContin and oxycodone.  At this juncture, she is not safe for oral intake; thus, her pain medications will need to be held.  Once her condition improves and she perks up, could consider resumption of oral pain regimen. Placed a consult to [facility], and their assistance with ongoing care this hospitalization is much appreciated.     3.  Hypertension.  It sounds as though she may not have been taking her medications regularly in recent days.  She was initially mildly hypertensive on presentation.  Her pressures subsequently improved after she was given Ativan.  As above, oral medications will be held for now until her mentation improves and she is safe for oral intake.     4.  Type 2 diabetes.  Typically manages with oral agents.  These will be held for the time being.  We will monitor Accu-Cheks and utilize sliding scale insulin as needed for now.     5.  Acute kidney injury.  Her creatinine today is elevated at  1.24. Her baseline renal function seems includes a creatinine of around 0.7.  Suspect secondary to poor oral intake in recent days.  She will be given IV fluids.  She received a bolus in the emergency room.  Continue with NS at 100 mL an hour.     6.  Lactic acidosis.  I suspect although she does have an elevated white count (22.3) and lactic acid of 3.1 (since come down to 2.3), I have a low suspicion for acute infection.  Her urinalysis is bland.  A chest x-ray was ordered and is pending at this time, though she has had no recent reports of cough, fevers or shortness of breath.  I suspect this could be secondary to volume depletion in the setting of her recent chemotherapy and poor oral intake. Continue IV hydration as above.      7.  Deep venous thrombosis prophylaxis:  PCDs.      CODE STATUS:  The patient is a DNR/DNI as outlined on paperwork provided by her granddaughter.        Olivia Solis MD   Text Page (7am to 6pm)    Interval History   Patient was confused and agitated throughout the night. MRI brain revealed multiple cerebral infarcts. Stroke workup pending, seen by neurology today.     -Data reviewed today: I reviewed all new labs and imaging results over the last 24 hours. I personally reviewed the EKG tracing showing SR.    Physical Exam   Temp: 97.2  F (36.2  C) Temp src: Oral BP: 123/63   Heart Rate: 75 Resp: 18 SpO2: 96 % O2 Device: Nasal cannula Oxygen Delivery: 3 LPM  Vitals:    01/31/20 0951   Weight: 61.7 kg (136 lb)     Vital Signs with Ranges  Temp:  [97.2  F (36.2  C)-97.8  F (36.6  C)] 97.2  F (36.2  C)  Heart Rate:  [58-98] 75  Resp:  [10-18] 18  BP: ()/(37-72) 123/63  SpO2:  [95 %-100 %] 96 %  I/O last 3 completed shifts:  In: 2500 [I.V.:1500; IV Piggyback:1000]  Out: 400 [Urine:400]    GENERAL:  The patient is an elderly-appearing female lying quietly on the gurney.  She does rouse to name and is in no acute distress.   NEUROLOGIC:  She is unable to assess degree of orientation. She  opens her eyes to name and is able to follow basic commands but is not answering questions.     HEENT:  Pupils are equal, round and reactive to light and accommodation.  Extraocular movements are intact.  Mucous membranes moist.  There is no facial asymmetry.     NEUROLOGIC:  Strength is diminished in the left upper extremity as compared to the right.  She is able to move both feet bilaterally but cannot lift either upper and lower extremity off the bed.   CARDIOVASCULAR:  Heart rate and rhythm regular.  No murmurs, gallops, rubs.  Pulses are +2 and symmetric in bilateral upper extremities.  There is +1 bilateral lower extremity edema to the mid shins.   RESPIRATORY:  Lungs are clear to auscultation bilaterally.  Free of rales or rhonchi.  No increased work of breathing or accessory muscle use.   ABDOMEN:  Obese, soft.  There is tenderness to palpation throughout the right side of the abdomen.  No guarding or rigidity.  Bowel sounds are positive throughout.   INTEGUMENTARY:  Warm and dry.  No rashes, jaundice or ecchymosis.    Medications     sodium chloride 100 mL/hr at 01/31/20 1808       insulin aspart  1-12 Units Subcutaneous Q4H     simvastatin  20 mg Oral QPM     sodium chloride (PF)  3 mL Intracatheter Q8H       Data   Recent Labs   Lab 01/31/20  1009   WBC 22.3*   HGB 11.7   MCV 94         POTASSIUM 4.2   CHLORIDE 100   CO2 23   BUN 38*   CR 1.24*   ANIONGAP 10   VIPIN 9.1   *   ALBUMIN 2.4*   PROTTOTAL 6.0*   BILITOTAL 1.2   ALKPHOS 153*   ALT 16   AST 80*   LIPASE 95       Imaging:   Recent Results (from the past 24 hour(s))   CT Head w/o Contrast    Narrative    CT SCAN OF THE HEAD WITHOUT CONTRAST   1/31/2020 10:46 AM     HISTORY: TIA, initial exam. Code Stroke. Dizziness and disorientation.    TECHNIQUE:  Axial images of the head and coronal reformations without  IV contrast material.  Radiation dose for this scan was reduced using  automated exposure control, adjustment of the mA  and/or kV according  to patient size, or iterative reconstruction technique.    COMPARISON: None.    FINDINGS: Moderate cerebral atrophy is present. There is an old right  basal ganglia infarct. There is moderately extensive white matter  changes in both hemispheres without mass effect. There is no evidence  for intracranial hemorrhage, mass effect, acute infarct, or skull  fracture.      Impression    IMPRESSION:  1. 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  2. Cerebral atrophy with moderately extensive white matter changes  which are most likely due to chronic small vessel ischemic disease.  3. No evidence for intracranial hemorrhage or any definite acute  process.    YENNY ALVARADO MD   CT Head Perfusion w Contrast    Narrative    CT HEAD PERFUSION WITH CONTRAST 1/31/2020 10:58 AM     HISTORY: TIA, initial exam. Code Stroke. Dizziness. Disorientation.    TECHNIQUE: Axial images were obtained through the brain with  intravenous contrast for CT brain perfusion imaging. 50 mL of  Isovue-370 was given. Radiation dose for this scan was reduced using  automated exposure control, adjustment of the mA and/or kV according  to patient size, or iterative reconstruction technique.    FINDINGS: Cerebral blood flow, cerebral blood volume, and mean transit  time maps are symmetric. There is no evidence for ischemia or infarct.      Impression    IMPRESSION: Normal CT brain perfusion imaging.    YENNY ALVARADO MD   CTA Head Neck with Contrast    Narrative    CTA  HEAD AND NECK WITH CONTRAST 1/31/2020 11:03 AM     HISTORY: Transient ischemic attack, initial exam. Code Stroke.  Dizziness. Left-sided neglect. Disorientation.    TECHNIQUE: Axial images were obtained through the head and neck with  intravenous contrast. 70 mL of Isovue-370 was given. Multiplanar  reconstructions were performed. 3-D reconstructions off a remote  workstation for CT angiography were also acquired. Carotid stenoses  were evaluated by comparing the caliber of the  proximal internal  carotid artery to the caliber of the distal internal carotid artery.  Radiation dose for this scan was reduced using automated exposure  control, adjustment of the mA and/or kV according to patient size, or  iterative reconstruction technique.     COMPARISON: None.    FINDINGS:    Brachiocephalic vessels: There is some mild calcific plaque in the  thoracic arch. Brachiocephalic vessels are within normal limits.    Right carotid system: Minimal calcific plaque is seen in the carotid  bifurcation. There is no stenosis or dissection.    Left carotid system: Minimal calcific plaque is seen in the carotid  bifurcation region. There is no evidence for stenosis or dissection.    Right vertebral artery: There is mild calcific plaque at the origin of  right vertebral artery. There is no stenosis or dissection.    Left vertebral artery: Normal.    [place]: Calcific plaque is seen in the carotid siphon  regions. There is no significant stenosis. The basilar artery is  widely patent. The proximal anterior, middle, and posterior cerebral  arteries are patent. There is no evidence for stenosis,  thromboembolism, aneurysm, or dissection. Incidental note is made of  an aplastic left A1 segment.    Other findings: Minimal degenerative changes are noted in the spine.      Impression    IMPRESSION:  1. Minimal atherosclerotic disease involving the carotid bifurcations  and carotid siphon regions without stenosis.  2. No evidence for stenosis, dissection, thromboembolism, or aneurysm.    YENNY ALVARADO MD   Chest XR,  PA & LAT    Narrative    CHEST TWO VIEW   1/31/2020 3:34 PM     HISTORY: Hypoxia.    COMPARISON: None.      Impression    IMPRESSION: Bilateral vascular congestion and pulmonary edema pattern.  Borderline cardiomegaly. Correlate with CHF. Patchy opacity at the  left lower lobe region that may be atelectasis or focal airspace  disease.    PETERSON MAE MD   MR Brain w/o & w Contrast    Narrative     MRI BRAIN WITHOUT AND WITH CONTRAST  1/31/2020 4:24 PM     HISTORY: Focal neurologic deficit, greater than 6 hours, stroke  suspected.    TECHNIQUE: Multiplanar, multisequence MRI of the brain without and  with 6 mL Gadavist     COMPARISON: Head CT from earlier the same day.     FINDINGS: Images degraded by motion artifact, particularly the axial  susceptibility weighted image.    Multiple areas of restricted diffusion within both cerebral  hemispheres compatible with acute infarcts. There are small acute  cortical infarcts within bilateral frontal, parietal, and occipital  lobes. The largest area of infarct is in the right occipital lobe  measuring approximately 1.9 x 1.6 cm. No definite large hemorrhagic  transformation of infarct although evaluation is limited given the  motion artifact. Moderate T2 hyperintensity and associated regional  edema in the areas of infarct without significant midline shift or  herniation.    Marked diffuse parenchymal volume loss. Chronic-appearing infarct in  the right basal ganglia. Marked periventricular white matter T2  hyperintensities are nonspecific, but likely related to chronic  microvascular ischemic disease. Ventricular size is unchanged without  evidence of hydrocephalus.     There is no abnormal intracranial enhancement.     1.6 cm T1 isointense, T2 hyperintense mass incompletely evaluated in  the right parotid gland.      Impression    IMPRESSION:    1. Multiple bilateral cortical infarcts, the largest infarct is within  the right occipital lobe. Mild regional edema in the areas of infarct  without significant midline shift or herniation. No obvious large  parenchymal hemorrhage although evaluation is slightly limited given  motion artifact.  2. Marked diffuse parenchymal volume loss and white matter changes  likely due to chronic microvascular ischemic disease.  3. Chronic-appearing infarct in the right basal ganglia.  4. Incompletely evacuated mass within the right  parotid gland  measuring up to 1.6 cm.      BLAIR KAUR MD

## 2020-02-01 NOTE — PROGRESS NOTES
"A/O to self only; confused; inconsistently following commands; slow to respond; garbled speech at times; minimal response--mostly yes or no. Unable to complete full neuro assessment due to inconsistently following commands; Neuro deficits noted-- Left visual field neglect/cuts; extraocular deviation to right.  C/O pain--appears restless in bed, pt states \"everything hurts\"; given PRN dilaudid and tylenol with some relief.  Sepsis BPA fired; LA 2.3, 500 mL bolus x 1 administered.  LUE charlene at times.  NPO; oral care performed by family. Bedrest; total care; turn and reposition q2h.  Plan for SW consult for hospice and speak with palliative care in AM through [facility].  Discharge pending clinical improvement.    "

## 2020-02-01 NOTE — PLAN OF CARE
Patient admitted today for acute bilateral strokes. Upon arrival to the floor, vitals are stable on 3L 02. CMS with BLE edema. Patient is lethargic, arouses briefly to voice and will follow a few simples commands, but otherwise quickly goes back to sleep. Minimal speech, only answering yes/no questions, requesting water saying mouth is dry. Right gaze preference, will not look to the left, grasps/extremities are strong, but LUE is quite tight and contracted. Incontinent x1, purewick now in place. Skin is pale, intact. BG covered with sliding scale insulin. Tele SR. Resting comfortably now but granddaughter states she usually takes quite a bit of oxycodone for her cancer pain, writer told family they can notify staff if patient starts having pain overnight. Dr. Friend spoke with family about poor prognosis, family verbalized wanting to talk about moving forward with hospice. Plan pending.

## 2020-02-01 NOTE — PROGRESS NOTES
Sepsis Evaluation Progress Note    I was called to see Tressa García due to abnormal vital signs triggering the Sepsis SIRS screening alert. She is not known to have an infection.     Physical Exam   Vital Signs:  Temp: 97.5  F (36.4  C) Temp src: Axillary BP: 108/51 Pulse: 99 Heart Rate: 58 Resp: 16 SpO2: 97 % O2 Device: Nasal cannula Oxygen Delivery: 3 LPM    Lab:  Lactic Acid   Date Value Ref Range Status   01/31/2020 3.1 (H) 0.7 - 2.0 mmol/L Final     Lactate for Sepsis Protocol   Date Value Ref Range Status   02/01/2020 2.3 (H) 0.7 - 2.0 mmol/L Final     Comment:     Significant value called to and read back by  DANNA MARADIAGA ON 73 AT 0410 DN         The patient is at baseline mental status.     The rest of their physical exam is significant for restlessness.    Assessment & Plan   NO EVIDENCE OF SEPSIS at this time.  Vital sign, physical exam, and lab findings are likely due to uncontrolled pain then opiate side effect. Will give IV fluid bolus for hypotension.     Disposition: The patient will remain on the current unit. We will continue to monitor this patient closely.   Staci Friend MD    Sepsis Criteria   Sepsis: 2+ SIRS criteria due to infection  Severe Sepsis: Sepsis AND 1+ new sign of acute organ dysfunction (Note: lactate >2 is organ dysfunction)  Septic Shock: Sepsis AND hypotension despite volume resuscitation with 30 ml/kg crystalloid

## 2020-02-01 NOTE — PROVIDER NOTIFICATION
"MD Notification    Notified Person: MD    Notified Person Name:  Ronna    Notification Date/Time: 2/1/20 0150    Notification Interaction:  paged    Purpose of Notification:  Pt restless and stating \"everything hurts\" Hx of taking 30 mg Oxycodone daily; given supp. Tylenol with no improvement; could we have something IV for pain.     Orders Received:  0158 called back; orders entered into chart.  Notify MD if PRN regimen ordered unable to adequately control pain.     Comments:      "

## 2020-02-01 NOTE — PLAN OF CARE
"Pt here with acute bilateral strokes. Oriented to self, sometimes to place. Slow, garbled speech. Neuros difficult to assess. Inconsistently following commands. Left visual field neglect and cuts. Extraocular deviation to right. Moderate strength. VSS on 3L O2 NC. Tele NSR. Speech adv pt to clear liquid diet for comfort. Still very lethargic. , 217 . Lift, turn and repo Q2hr as tolerated. Pt restless at times, verbalizing pain as \"everywhere\". Controlled with IV dilaudid. PIV infusing @100. Pt scoring green on the Aggression Stop Light Tool. Echo today. Plan pending. Family meeting today to talk about moving forward with hospice care.   "

## 2020-02-01 NOTE — PROGRESS NOTES
"   02/01/20 1445   General Information   Onset Date 01/30/20   Start of Care Date 02/01/20   Referring Physician Olivia Solis MD   Patient Profile Review/OT: Additional Occupational Profile Info See Profile for full history and prior level of function   Patient/Family Goals Statement did not state   Swallowing Evaluation Bedside swallow evaluation   Behaviorial Observations Lethargic   Mode of current nutrition NPO   Respiratory Status O2 Supply   Type of O2 supply Nasal cannula   Comments \"Tressa García is a 89-year-old lady admitted to the hospital for evaluation of altered mental status.  She is under the care of my colleague Dr. Bruce for treatment of her locally advanced colon cancer.  She is currently undergoing treatment with XELOX, last received on 1/28/2020. She apparently had a fall on 1/29/2020.  This was attributed to poor oral intake and weakness.  She was given IV fluids in the oncology clinic on 1/30/2020.  However the patient was noted to have developed altered mental status beginning 1/31/2020.  The initial concern for the seizure was a stroke.  MRI brain demonstrated multiple bilateral cortical ischemic infarcts.\" Chest x-ray, \"IMPRESSION: Bilateral vascular congestion and pulmonary edema pattern. Patchy opacity at the left lower lobe region that may be atelectasis or focal airspace disease\" No hx of SLP services.    Clinical Swallow Evaluation   Oral Musculature unable to assess due to poor participation/comprehension   Clinical Swallow Eval: Thin Liquid Texture Trial   Mode of Presentation, Thin Liquids spoon;self-fed   Oral Phase of Swallow Premature pharyngeal entry   Oral Residue right lip drooling   Pharyngeal Phase of Swallow intact   Diagnostic Statement no overt s/sx of aspiration   Swallow Eval: Clinical Impressions   Skilled Criteria for Therapy Intervention Skilled criteria met.  Treatment indicated.   Functional Assessment Scale (FAS) 3   Treatment Diagnosis dysphagia   Diet " texture recommendations Clear liquid;Thin liquids   Recommended Feeding/Eating Techniques check mouth frequently for oral residue/pocketing;hard swallow w/ each bite or sip;no straws;small sips/bites   Demonstrates Need for Referral to Another Service dietitian   Therapy Frequency Daily   Predicted Duration of Therapy Intervention (days/wks) 1 week   Anticipated Discharge Disposition extended care facility   Risks and Benefits of Treatment have been explained. Yes   Patient, family and/or staff in agreement with Plan of Care Yes   Clinical Impression Comments SLP: Limited evaluation complete due to pt's level of alertness. Family reported no dysphagia symptoms, however, very limited intake that last month. Pt was easily aroused and stated name and hospital. Pt did not allow aggressive oral cares and did not follow commands for oral motor exam. Ice chip trialed with prolonged mastication and delayed initiation. Pharyngeal swallow appeared WFL and no overt s/sx of aspiration.  Pt accepted very small sips of thin liquids with anterior spillage, but no s/sx of aspiration. Pt not appropriate for puree solids. Granddaughter agreed with recommended: cautiously starting clear liquid, thin liquids with encouraging pt to feed herself with assist as needed. Hold PO if overt s/sx of aspiration. Crush necessary meds only.    Total Evaluation Time   Total Evaluation Time (Minutes) 20

## 2020-02-02 NOTE — PROVIDER NOTIFICATION
Page to hospitalist- can we get order for nebulizers as needed for wheezing? Orders placed for scheduled nebulizers q4 hours.

## 2020-02-02 NOTE — PROVIDER NOTIFICATION
MD Notification    Notified Person: MD    Notified Person Name:  Will    Notification Date/Time: 2/2/2020 at 0858    Notification Interaction: Web page    Purpose of Notification: Daughter and granddaughter here and would like to discuss comfort cares/hospice. Meeting scheduled this afternoon with hospice.    Orders Received: MD to see patient and family in one hour.     Comments: SIRS alert fired. Per MD, okay to not order at this time.

## 2020-02-02 NOTE — PROGRESS NOTES
SPIRITUAL HEALTH SERVICES Progress Note  [facility] 73     visited pt per referral. Pt's daughter, granddaughter, and other family members were present. Pt was unable to respond, but SH initiated care with family members. Family had a hospice conference prior to SH visit and expressed worry over the process of getting pt best care needed. SH performed a blessing at request of the family and encouraged family to share meaningful stories and lessons pt taught them. Pt's daughter exhibited great distress over what potentially lies ahead. Family said that care they have received from all staff has been excellent and has made time spent here easier.  offered continued support and resources to pt and family as needed, which has been well received thus far.  encourages follow up in the next day to check on family as pt appears to be nearing active death.    Santhosh Toth   Intern

## 2020-02-02 NOTE — PROGRESS NOTES
"  [facility]    Stroke Progress Note    Interval Events  Patient was restless throughout the night. Remains afebrile. Blood cultures are negative day 2. She has been made DNR/DNI. Family is moving toward hospice care, will be discussing hospice care options today. Patients family refused for further blood draws due to multiple failed attempts/difficult stick.   She has new onset AFIB, rate controlled noted last night.     Impression  Ischemic Stroke Likely secondary to AFIB and/or hypercoagulability from malignancy.   From stroke standpoint, patient may take couple of days to improve on her mental status. However, overall condition may not improve due to ongoing commodities/maligancy.   Echo is negative for any signs of endocarditis. Low EF 30-35%.     Recommendations:  - Continue aspirin PO 81 mg/ suppository 300 mg  - Subcutaneous heparin 5000 international unit(s) q12  - Simvastatin 20 mg daily  - Telemetry  - Euglycemia and normothermia   - Oncology and palliative care following  - Goals of care discussion. If decide to continue the full care, She would benefit from therapeutic anticoagulation for further thromboembolism and stroke prevention.   - If moving to comfort cares, disregard above    - Rest of the management per primary team     Will sign off. Please call us with any questions      Louie Garvin MD  Fellow, Vascular Neurology  Text Page     To page stroke neurology after hours through [facility], click here: [facility]  Choose \"On Call\" tab at top, then search dropdown box for \"Neurology Adult\"             ______________________________________________________    Medications   Home Meds  Prior to Admission medications    Medication Sig Start Date End Date Taking? Authorizing Provider   capecitabine (XELODA) 500 MG tablet Take 1,000 mg by mouth 2 times daily For 14 days every 3 weeks per [facility]   Yes Reported, Patient   furosemide (LASIX) 20 MG tablet Take 20 mg by mouth every " morning   Yes Reported, Patient   metFORMIN (GLUCOPHAGE) 1000 MG tablet Take 1,000 mg by mouth every evening   Yes Reported, Patient   oxyCODONE (OXYCONTIN) 10 MG 12 hr tablet Take 10 mg by mouth every morning Pt takes 10 mg QAM and 20 mg QPM   Yes Reported, Patient   oxyCODONE (OXYCONTIN) 20 MG 12 hr tablet Take 20 mg by mouth every evening Pt takes 10 mg QAM and 20 mg QPM   Yes Reported, Patient   oxyCODONE (ROXICODONE) 5 MG tablet Take 5 mg by mouth every 4 hours as needed for breakthrough pain or severe pain   Yes Reported, Patient   amLODIPine (NORVASC) 2.5 MG tablet Take 2.5 mg by mouth every evening    Reported, Patient   aspirin 81 MG EC tablet Take 81 mg by mouth daily    Reported, Patient   cycloSPORINE (RESTASIS) 0.05 % ophthalmic emulsion Place 1 drop into both eyes 2 times daily    Reported, Patient   glimepiride (AMARYL) 2 MG tablet Take 2 mg by mouth every evening    Reported, Patient   losartan (COZAAR) 25 MG tablet Take 25 mg by mouth every evening    Reported, Patient   OXALIPLATIN IV Inject 200 mg into the vein every 21 days 200 mg last given on 1/28/20 per [facility]    Reported, Patient   simvastatin (ZOCOR) 10 MG tablet Take 10 mg by mouth At Bedtime    Reported, Patient       Scheduled Meds    aspirin  81 mg Oral Daily     aspirin  300 mg Rectal Daily     heparin ANTICOAGULANT  5,000 Units Subcutaneous Q12H     insulin aspart  1-12 Units Subcutaneous Q4H     levalbuterol  1.25 mg Nebulization Q4H     simvastatin  20 mg Oral QPM     sodium chloride (PF)  3 mL Intracatheter Q8H       Infusion Meds      PRN Meds  acetaminophen, acetaminophen, bisacodyl, glucose **OR** dextrose **OR** glucagon, HYDROmorphone, lidocaine 4%, lidocaine (buffered or not buffered), LORazepam, melatonin, metoprolol, naloxone, ondansetron **OR** ondansetron, polyethylene glycol, potassium chloride, potassium chloride with lidocaine, potassium chloride, potassium chloride, prochlorperazine **OR** prochlorperazine  **OR** prochlorperazine, senna-docusate **OR** senna-docusate, sodium chloride (PF)       PHYSICAL EXAMINATION  Temp:  [97.2  F (36.2  C)-98.2  F (36.8  C)] 98.2  F (36.8  C)  Heart Rate:  [] 105  Resp:  [14-20] 20  BP: (132-158)/(58-97) 132/89  SpO2:  [94 %-100 %] 94 %     Neurologic  Mental Status: Lethargic, did open eyes to verbal commands but did not follow commands  Cranial Nerves:  facial sensation intact and symmetric, facial movements symmetric, hearing not formally tested but intact to conversation, eyes midline, could not test for the rest due to her mental status  Motor: normal tone, no abnormal movements, able to move all limbs spontaneously  Reflexes:  toes down-going  Sensory:  withdraws to pain and symmetric throughout upper and lower extremities  Coordination:  IRIS  Station/Gait:  deferred        Imaging  I personally reviewed all imaging; relevant findings per HPI.     Lab Results Data   CBC  Recent Labs   Lab 01/31/20  1009   WBC 22.3*   RBC 3.88   HGB 11.7   HCT 36.5        Basic Metabolic Panel    Recent Labs   Lab 01/31/20  1009      POTASSIUM 4.2   CHLORIDE 100   CO2 23   BUN 38*   CR 1.24*   *   VIPIN 9.1     Liver Panel  Recent Labs   Lab Test 01/31/20  1009   PROTTOTAL 6.0*   ALBUMIN 2.4*   BILITOTAL 1.2   ALKPHOS 153*   AST 80*   ALT 16     INRNo lab results found.   Lipid ProfileNo lab results found.  A1C  Recent Labs   Lab Test 01/31/20  1009   A1C 8.6*     Troponin Farrah results for input(s): TROPI in the last 168 hours.

## 2020-02-02 NOTE — PLAN OF CARE
PT/OT: Orders received, chart reviewed, discussed status with MD. Pt admitted with multiple infarcts. Plan for family meeting with hospice today, possible transition to comfort care. Per MD, hold PT/OT evals this date to determine goals of care.

## 2020-02-02 NOTE — PROVIDER NOTIFICATION
MD Notification    Notified Person: MD    Notified Person Name: Dr. Solis    Notification Date/Time: 2/2/2020 at 1251    Notification Interaction: Web page    Purpose of Notification: Please discontinue telemetry, labs, every 8 hours vital sign checks and anything else not applicable to comfort cares.    Orders Received: Above orders discontinued by MD.    Comments:

## 2020-02-02 NOTE — PLAN OF CARE
"Unable to assess orientation. Restless when awake, trying to pull at gown, saying \"help me\", but otherwise not conversing or interacting. Not following commands. Difficult to complete Neuro assessment. Moving all extremities spontaneously but limbs contract and tighten with turns or attempted assessments.  VSS ex Tachycardic, PRN metoprolol given x1. On 2 L NC. Tele changes throughout the night: Afib RVR, Afib CVR, most recently SR. LS: wheezy/ Coarse improving throughout night. Turn and repo, Ax2. PRN dilaudid given for pain. Incontinent purewick in place. 1 BM overnight. Clear liquid diet. Insulin not given d/t patient  too lethargic to safely take PO. Family at bedside very involved. Continue to monitor.  "

## 2020-02-02 NOTE — PROVIDER NOTIFICATION
MD Notification    Notified Person: MD    Notified Person Name: Dr. Solis    Notification Date/Time: 2/2/2020 at 1654    Notification Interaction: Web page    Purpose of Notification: Please order the PRN Roxanol high concentration instead of the morphine solution 10mg.    Orders Received: See order for PRN Roxanol.    Comments:

## 2020-02-02 NOTE — PROGRESS NOTES
OVERNIGHT HOSPITALIST CROSS COVER NOTE    I was called to assess and examined her at the bedside 10 PM and have reassessed her several times overnight for worsening dyspnea, found to have new onset afib with RVR. I have reviewed this finding with her daughter at the bedside and also updated her regarding TTE findings of EF 30%. Her daughter agrees that recurrent strokes may be very likely in this scenario and that hospice will be most beneficial for Ms. García going forward. There is in fact a meeting set up for this afternoon for that, mainly to try to figure out the logistics of a hospice discharge (ie to home vs facility). For now we have increased frequency of Dilaudid to q 2 prn for comfort; I also ordered a dose of 20 mg IV Lasix and prn IV Metoprolol for the RVR.     Ms. García has been wheezing and restless, dyspneic most of the night. Her daughter expresses understanding that Ms. García's overall life expectancy at this point is likely very short.

## 2020-02-02 NOTE — PLAN OF CARE
"Patient here with colon cancer and bilateral acute infarcts. Lethargic this afternoon and was able to rest comfortably between doses of IV Dilaudid. Not following many commands, moving all extremities spontaneously but limbs contract and tighten with turns or attempted assessments. Restless when awake, trying to pull at gown, saying \"help me\", but otherwise not conversing or interacting. Patient became much more restless later in the evening and was found to be in afib (see MD notification note). Bedrest. Purewick in place, clear chante urine. NPO this evening as patient was too lethargic to safely take PO. IV Dilaudid given at families request. Granddaughter at the bedside and very involved. Per Dr. Bolanos, meds will be ordered overnight to treat symptoms and rounding physicians can have further discussions with family tomorrow about plan of care.   "

## 2020-02-02 NOTE — PROGRESS NOTES
"[facility] Progress Note          Assessment and Plan:   Colon cancer    -follows with Dr. Bruce    -colonoscopy - partially obstructing tumor at the IC valve and another one in the ascending colon    -PET/CT showed no distant disease but with large mesenteric mass    -not felt to be candidate for upfront surgical resection     -dMMR and as such not a candidate for single agent Xeloda     -received 3 cycles of XELOX, last one on 2020    -not a candidate for further systemic treatments    -met with family and answered their questions    -they have made a decision to transition her to comfort cares/hospice which is reasonable     Multiple bilateral cortical ischemic infracts    -reviewed MRI brain 2020    -noted plan for DAPT/statin    -echo showed LVEF 30-35%    -neuro following      Leukocytosis    -likely reactive     -did not receive neulasta following chemo     -CXR showing LLL opacity     -blood cultures so far showing no growth    -no further work up since transitioned to comfort cares          DNI/DNR    Hospice meeting scheduled today.  Now on comfort cares.      Angel Garcia MD              Interval History:   Transitioned to comfort cares.  Hospice consulted.                Review of Systems:   As per subjective, otherwise 5 systems reviewed and negative.           Physical Exam:   Blood pressure 112/55, pulse 99, temperature 97.3  F (36.3  C), temperature source Axillary, resp. rate 16, height 1.473 m (4' 10\"), weight 66.8 kg (147 lb 3.2 oz), SpO2 96 %.      Vital Sign Ranges  Temperature Temp  Av.5  F (36.4  C)  Min: 97.2  F (36.2  C)  Max: 98.2  F (36.8  C)   Blood pressure Systolic (24hrs), Av , Min:112 , Max:158        Diastolic (24hrs), Av, Min:55, Max:97      Pulse No data recorded   Respirations Resp  Av.2  Min: 14  Max: 20   Pulse oximetry SpO2  Av.1 %  Min: 94 %  Max: 100 %         Intake/Output Summary (Last 24 hours) at 2020 1159  Last " data filed at 2020 2200  Gross per 24 hour   Intake 758.33 ml   Output 500 ml   Net 258.33 ml       Constitutional:   lethargic   Skin:   No rashess.   HEENT:   Normocephalic, atraumatic. Oropharynx clear with no mucosal lesions or thrush.   Neck:   Supple.   Lungs:   Clear to auscultation bilaterally.   Cardiovascular:   Irregular rhythm   Abdomen:   Bowel sounds heard   Extremities:   No clubbing, cyanosis, or edema.   Neurological:   Right gaze preference            Medications:     No current outpatient medications on file.                Data:     Results for orders placed or performed during the hospital encounter of 20 (from the past 24 hour(s))   Glucose by meter   Result Value Ref Range    Glucose 217 (H) 70 - 99 mg/dL   Echocardiogram Complete    Narrative    [id number]  [id number]  [id number]  [id number]^WINTER^VINNY^[facility]  Echocardiography Laboratory  [address] [place] [address]        Name: REINALDO ANDREWS  MRN: [id number]  : 1929  Study Date: 2020 01:05 PM  Age: 89 yrs  Gender: Female  Patient Location: [facility]  Reason For Study: TIA  Ordering Physician: VINNY MAX  Referring Physician: JONAS ANSARI  Performed By: Tomer Vinson RDCS     BSA: 1.5 m2  Height: 58 in  Weight: 136 lb  HR: 66  BP: 123/63 mmHg  _____________________________________________________________________________  __        Procedure  Complete Portable Echo Adult. Optison (NDC #2226-8127) given intravenously.  _____________________________________________________________________________  __        Interpretation Summary     Technically difficult imaging  A cardiac source of embolus was not identified.  There is anterior, septal, and apical wall akinesis.  Left ventricular systolic function is moderate to severely reduced.  The visual ejection fraction is estimated at 30-35%.  Sinus rhythm was noted.  Doppler interrogaiton does not demonstrate significant  stenosis or  insufficiency involving cardiac valves.  No old studies available for comparison.  _____________________________________________________________________________  __        Left Ventricle  The left ventricle is normal in size. There is normal left ventricular wall  thickness. Left ventricular systolic function is moderate to severely reduced.  The visual ejection fraction is estimated at 30-35%. Grade I or early  diastolic dysfunction. There is anterior, septal, and apical wall akinesis.  There is no thrombus seen in the left ventricle.     Right Ventricle  The right ventricle is normal in structure, function and size. There is no  mass or thrombus in the right ventricle.     Atria  Normal left atrial size. Right atrial size is normal. There is no atrial shunt  seen. The left atrial appendage is not well visualized.     Mitral Valve  The mitral valve leaflets appear normal. There is no evidence of stenosis,  fluttering, or prolapse. There is no mitral regurgitation noted. There is no  mitral valve stenosis.        Tricuspid Valve  Normal tricuspid valve. The right ventricular systolic pressure is  approximated at 21.0 mmHg plus the right atrial pressure. Right ventricle  systolic pressure estimate normal. There is mild (1+) tricuspid regurgitation.  There is no tricuspid stenosis.     Aortic Valve  There is mild trileaflet aortic sclerosis. No aortic regurgitation is present.  No aortic stenosis is present.     Pulmonic Valve  Normal pulmonic valve. There is no pulmonic valvular regurgitation. There is  no pulmonic valvular stenosis.     Vessels  The aortic root is normal size. Normal size ascending aorta. Inferior vena  cava not well visualized for estimation of right atrial pressure. The  pulmonary artery is normal size.     Pericardium  The pericardium appears normal. There is no pleural effusion.        Rhythm  Sinus rhythm was  noted.  _____________________________________________________________________________  __  MMode/2D Measurements & Calculations  IVSd: 0.75 cm     LVIDd: 4.1 cm  LVIDs: 2.7 cm  LVPWd: 0.84 cm  FS: 32.6 %  LV mass(C)d: 95.3 grams  LV mass(C)dI: 61.6 grams/m2  Ao root diam: 3.2 cm  LA dimension: 3.0 cm  LA/Ao: 0.95  LA Volume (BP): 52.5 ml  LA Volume Index (BP): 33.9 ml/m2  RWT: 0.41           Doppler Measurements & Calculations  MV E max malik: 46.2 cm/sec  MV A max malik: 82.0 cm/sec  MV E/A: 0.56  MV dec time: 0.22 sec  PA acc time: 0.11 sec  TR max malik: 229.0 cm/sec  TR max P.0 mmHg  E/E' av.5  Lateral E/e': 24.7  Medial E/e': 22.4           _____________________________________________________________________________  __           Report approved by: Dr. Rodo Ortega 2020 03:18 PM      Glucose by meter   Result Value Ref Range    Glucose 181 (H) 70 - 99 mg/dL   Glucose by meter   Result Value Ref Range    Glucose 185 (H) 70 - 99 mg/dL   EKG 12-lead, tracing only   Result Value Ref Range    Interpretation ECG Click View Image link to view waveform and result    Glucose by meter   Result Value Ref Range    Glucose 160 (H) 70 - 99 mg/dL   Glucose by meter   Result Value Ref Range    Glucose 171 (H) 70 - 99 mg/dL   Glucose by meter   Result Value Ref Range    Glucose 177 (H) 70 - 99 mg/dL

## 2020-02-02 NOTE — PROVIDER NOTIFICATION
MD Notification    Notified Person: MD    Notified Person Name: Dr. Solis    Notification Date/Time: 2/2/2020 at 1510    Notification Interaction: Web page    Purpose of Notification: Please order PRN pain med in sublingual drops.    Orders Received: See order for PRN morphine solution.    Comments:

## 2020-02-02 NOTE — PLAN OF CARE
"Discharge Planner SLP   Patient plan for discharge: Family states transitioning to comfort cares & hospice meeting this afternoon  Current status: Discussed swallow needs with family present at bedside. Family reported that \"she is on her way out and we do not need your services anymore\". Asked if they had questions regarding swallowing etc, and educated to continue to keep pt comfortable with moisten oral swabs. Family reported they do not require any further SLP guidance during this time. Will complete the orders  Barriers to return to prior living situation: Comfort cares  Recommendations for discharge: Defer to medical team  Rationale for recommendations: No further SLP services       Entered by: Jailyn Spivey 02/02/2020 11:38 AM     Speech Language Therapy Discharge Summary    Reason for therapy discharge:    Change in medical status.    Progress towards therapy goal(s). See goals on Care Plan in [facility] electronic health record for goal details.  Comfort cares     Therapy recommendation(s):    No further therapy is recommended.    Per last SLP note from 2/1: cautiously starting clear liquid, thin liquids with encouraging pt to feed herself with assist as needed. Hold PO if overt s/sx of aspiration. Crush necessary meds only.       "

## 2020-02-02 NOTE — PROGRESS NOTES
[facility]    Hospitalist Progress Note    Assessment & Plan   Tressa García is a 89-year-old female with a past medical history of stage III colon cancer, hypertension, hyperlipidemia and type 2 diabetes among other medical problems, who was brought to the emergency room this morning for evaluation of neurologic changes including altered mental status, left-sided hemineglect and dystonia in the left upper extremity.  Workup in the emergency room is concerning for possible stroke versus seizure.  She is being admitted to the hospital today for ongoing evaluation and care.     1.  Altered mental status with neurologic changes as described above including a right-sided gaze preference and left upper extremity dystonia.  A code stroke was called in the emergency room.  Initial head imaging including a stat head CT, CTA and CT perfusion scan were all negative for acute abnormalities. Reviewed MRI findings of acute bilateral strokes. Reviewed neurology recommendations for DAPT and additional workup. Reviewed poor prognosis for recovery in the setting of active malignancy and that she is unlikely going to be a candidate for ongoing chemo. Family planning for hospice, discussion today planned with hospice team.   -Family declining additional stroke evals at this time. Overnight, pt did not do well. Wheezing and restless, dyspneic most of the night. Her daughter expresses understanding that Ms. García's overall life expectancy at this point is likely very short. Family decided to make comfort care today 2/2/20. Orders in.     2.  Stage III colon cancer.  [facility]. Bruce [facility]  On chemotherapy with both IV and oral chemotherapy agents. Takes Xeloda daily and most recently received oxaliplatin on 1/28/20.  Some concerns that her dystonia could have been precipitated by her chemotherapy.  She saw Dr. Bruce in followup just yesterday, on 1/30/20.   At  that time, she was noted he needs some IV fluids for hydration.  She is on medications. She follows with the Palliative [facility] through [facility] who assists with managing her pain medications including OxyContin and oxycodone.  At this juncture, she is not safe for oral intake; thus, her pain medications will need to be held. [facility] following. Per their note yesterday, doubt will remain appropriate candidate for further systemic treatments.     3.  Hypertension.  It sounds as though she may not have been taking her medications regularly in recent days.  She was initially mildly hypertensive on presentation.  Her pressures subsequently improved after she was given Ativan.     4.  Type 2 diabetes.  Typically manages with oral agents.  These will be held for the time being.  We will monitor Accu-Cheks and utilize sliding scale insulin as needed for now.     5.  Acute kidney injury.  at this point given we are moving towards hospice, not aggressively treating. Given lasix overnight given her SOB.     6.  Lactic acidosis.  Low suspicion for acute infection.  Her urinalysis is bland. Chest xray with some pulm edema, given lasix.     7.  Deep venous thrombosis prophylaxis:  PCDs.      CODE STATUS:  The patient is a DNR/DNI as outlined on paperwork provided by her granddaughter.     Dispo: likely will pass away here. Family discussing hospice.        Olivia Solis MD   Text Page (7am to 6pm)    Interval History   Patient was confused and agitated throughout the night. Dyspnic, given lasix. Overall not doing well, moving towards hospice. Meeting today in afternoon. MRI brain revealed multiple cerebral infarcts. Now on comfort cares.     -Data reviewed today: I reviewed all new labs and imaging results over the last 24 hours. I personally reviewed the EKG tracing showing SR.    Physical Exam   Temp: 98.2  F (36.8  C) Temp src: Axillary BP: 132/89   Heart Rate: 105 Resp: 20 SpO2: 94 % O2 Device: Nasal cannula Oxygen  Delivery: 3 LPM  Vitals:    01/31/20 0951 02/02/20 0636   Weight: 61.7 kg (136 lb) 66.8 kg (147 lb 3.2 oz)     Vital Signs with Ranges  Temp:  [97.2  F (36.2  C)-98.2  F (36.8  C)] 98.2  F (36.8  C)  Heart Rate:  [] 105  Resp:  [14-20] 20  BP: (132-158)/(58-97) 132/89  SpO2:  [94 %-100 %] 94 %  I/O last 3 completed shifts:  In: 758.33 [I.V.:758.33]  Out: 500 [Urine:500]    GENERAL:  The patient is an elderly-appearing female lying quietly on the gurney.  She does rouse to name and is in no acute distress.   NEUROLOGIC:  She is unable to assess degree of orientation. She opens her eyes to name and is able to follow basic commands but is not answering questions.     HEENT:  Pupils are equal, round and reactive to light and accommodation.  Extraocular movements are intact.  Mucous membranes moist.  There is no facial asymmetry.     NEUROLOGIC:  Strength is diminished in the left upper extremity as compared to the right.  She is able to move both feet bilaterally but cannot lift either upper and lower extremity off the bed.   CARDIOVASCULAR:  Heart rate and rhythm regular.  No murmurs, gallops, rubs.  Pulses are +2 and symmetric in bilateral upper extremities.  There is +1 bilateral lower extremity edema to the mid shins.   RESPIRATORY:  Lungs are clear to auscultation bilaterally.  Free of rales or rhonchi.  No increased work of breathing or accessory muscle use.   ABDOMEN:  Obese, soft.  There is tenderness to palpation throughout the right side of the abdomen.  No guarding or rigidity.  Bowel sounds are positive throughout.   INTEGUMENTARY:  Warm and dry.  No rashes, jaundice or ecchymosis.    Medications       aspirin  81 mg Oral Daily     aspirin  300 mg Rectal Daily     heparin ANTICOAGULANT  5,000 Units Subcutaneous Q12H     insulin aspart  1-12 Units Subcutaneous Q4H     levalbuterol  1.25 mg Nebulization Q4H     simvastatin  20 mg Oral QPM     sodium chloride (PF)  3 mL Intracatheter Q8H       Data    Recent Labs   Lab 20  1009   WBC 22.3*   HGB 11.7   MCV 94         POTASSIUM 4.2   CHLORIDE 100   CO2 23   BUN 38*   CR 1.24*   ANIONGAP 10   VIPIN 9.1   *   ALBUMIN 2.4*   PROTTOTAL 6.0*   BILITOTAL 1.2   ALKPHOS 153*   ALT 16   AST 80*   LIPASE 95       Imaging:   Recent Results (from the past 24 hour(s))   Echocardiogram Complete    Narrative    [id number]  [id number]  [id number]  [id number]^WINTER^VINNY^[facility]           [facility]  Echocardiography Laboratory  [address] [place] [address]        Name: REINALDO ANDREWS  MRN: [id number]  : 1929  Study Date: 2020 01:05 PM  Age: 89 yrs  Gender: Female  Patient Location: [facility]  Reason For Study: TIA  Ordering Physician: VINNY MAX  Referring Physician: JONAS ANSARI  Performed By: Tomer Vinson RDCS     BSA: 1.5 m2  Height: 58 in  Weight: 136 lb  HR: 66  BP: 123/63 mmHg  _____________________________________________________________________________  __        Procedure  Complete Portable Echo Adult. Optison (NDC #[id number]) given intravenously.  _____________________________________________________________________________  __        Interpretation Summary     Technically difficult imaging  A cardiac source of embolus was not identified.  There is anterior, septal, and apical wall akinesis.  Left ventricular systolic function is moderate to severely reduced.  The visual ejection fraction is estimated at 30-35%.  Sinus rhythm was noted.  Doppler interrogaiton does not demonstrate significant stenosis or  insufficiency involving cardiac valves.  No old studies available for comparison.  _____________________________________________________________________________  __        Left Ventricle  The left ventricle is normal in size. There is normal left ventricular wall  thickness. Left ventricular systolic function is moderate to severely reduced.  The visual ejection fraction is estimated at 30-35%. Grade  I or early  diastolic dysfunction. There is anterior, septal, and apical wall akinesis.  There is no thrombus seen in the left ventricle.     Right Ventricle  The right ventricle is normal in structure, function and size. There is no  mass or thrombus in the right ventricle.     Atria  Normal left atrial size. Right atrial size is normal. There is no atrial shunt  seen. The left atrial appendage is not well visualized.     Mitral Valve  The mitral valve leaflets appear normal. There is no evidence of stenosis,  fluttering, or prolapse. There is no mitral regurgitation noted. There is no  mitral valve stenosis.        Tricuspid Valve  Normal tricuspid valve. The right ventricular systolic pressure is  approximated at 21.0 mmHg plus the right atrial pressure. Right ventricle  systolic pressure estimate normal. There is mild (1+) tricuspid regurgitation.  There is no tricuspid stenosis.     Aortic Valve  There is mild trileaflet aortic sclerosis. No aortic regurgitation is present.  No aortic stenosis is present.     Pulmonic Valve  Normal pulmonic valve. There is no pulmonic valvular regurgitation. There is  no pulmonic valvular stenosis.     Vessels  The aortic root is normal size. Normal size ascending aorta. Inferior vena  cava not well visualized for estimation of right atrial pressure. The  pulmonary artery is normal size.     Pericardium  The pericardium appears normal. There is no pleural effusion.        Rhythm  Sinus rhythm was noted.  _____________________________________________________________________________  __  MMode/2D Measurements & Calculations  IVSd: 0.75 cm     LVIDd: 4.1 cm  LVIDs: 2.7 cm  LVPWd: 0.84 cm  FS: 32.6 %  LV mass(C)d: 95.3 grams  LV mass(C)dI: 61.6 grams/m2  Ao root diam: 3.2 cm  LA dimension: 3.0 cm  LA/Ao: 0.95  LA Volume (BP): 52.5 ml  LA Volume Index (BP): 33.9 ml/m2  RWT: 0.41           Doppler Measurements & Calculations  MV E max malik: 46.2 cm/sec  MV A max malik: 82.0 cm/sec  MV  E/A: 0.56  MV dec time: 0.22 sec  PA acc time: 0.11 sec  TR max malik: 229.0 cm/sec  TR max P.0 mmHg  E/E' av.5  Lateral E/e': 24.7  Medial E/e': 22.4           _____________________________________________________________________________  __           Report approved by: Dr. Rodo Ortega 2020 03:18 PM

## 2020-02-02 NOTE — PROVIDER NOTIFICATION
Throughout the evening patient appeared to be working harder to breath, using abdominal muscles etc. Lung sounds with crackles throughout. Was able to rest some after doses of IV Dilaudid but was becoming more restless later in the evening. It was then noted that patients HR was tachycardic and irregular, 12 Leak EKG obtained and patient was in rapid afib. Notified Dr. Bolanos, who came to see the patient and spoke with the patients granddaughter. Orders placed for IV Lasix and a beta blocker for heart rate. TORB also received to discontinue IVF.

## 2020-02-02 NOTE — PROGRESS NOTES
Shift summary  Tressa García with hx colon cancer admitted with altered mental status. A-fIB REPORTED TODAY. .  Respiratory history: RAD  Pt on 5 lpm NC - spo2 84%, RR28 and labored     LUNGS:  Upper air wheezes, fine crackles in bases     A/p  Noted comfort care   - continue nebs as ordered     Luis Antonio Bonilla, RT

## 2020-02-03 NOTE — PLAN OF CARE
Lethargic. IRIS orientation. Opens eyes to voice/touch temporarily. FLACC score of 5. Restless at times. Comfort cares. Labored/agonal breathing. Given PRN morphine. Continues on 5L O2 via NC. Expiratory wheezing at times. Continues on scheduled nebs. RT following. Turned/repositioned every 2 hours or as tolerated. Oral cares provided. Incontinent of bladder, purewick in place. No IV access. Nursing will continue to monitor. Family at bedside and involved with cares.

## 2020-02-03 NOTE — PROGRESS NOTES
House Officer Death Pronouncement    Called by nursing staff to pronounce Tressa García dead.    Physical Exam: Unresponsive to noxious stimuli, Spontaneous respirations absent, Breath sounds absent, Heart sounds absent, Pupillary light reflex absent and Corneal blink reflex absent    Patient was pronounced dead at 3:52 AM, February 3, 2020.    No data filed        Active Problems:    Altered mental status       Infectious disease present?: NO    Communicable disease present? (examples: HIV, chicken pox, TB, Ebola, CJD) :  NO    Multi-drug resistant organism present? (example: MRSA): NO    Please consider an autopsy if any of the following exist:  NO Unexpected or unexplained death during or following any dental, medical, or surgical diagnostic treatment procedures.   NO Death of mother at or up to seven days after delivery.     NO All  and pediatric deaths.     NO Death where the cause is sufficiently obscure to delay completion of the death certificate.   NO Deaths in which autopsy would confirm a suspected illness/condition that would affect surviving family members or recipients of transplanted organs.     The following deaths must be reported to the 's Office:  NO A death that may be due entirely or in part to any factors other than natural disease (recent surgery, recent trauma, suspected abuse/neglect).   NO A death that may be an accident, suicide, or homicide.     NO Any sudden, unexpected death in which there is no prior history of significant heart disease or any other condition associated with sudden death.   NO A death under suspicious, unusual, or unexpected circumstances.    NO Any death which is apparently due to natural causes but in which the  does not have a personal physician familiar with the patient s medical history, social, or environmental situation or the circumstances of the terminal event.   NO Any death apparently due to Sudden Infant Death Syndrome.     NO Deaths  that occur during, in association with, or as consequences of a diagnostic, therapeutic, or anesthetic procedure.   NO Any death in which a fracture of a major bone has occurred within the past (6) six months.   NO A death of persons note seen by their physician within 120 days of demise.     NO Any death in which the  was an inmate of a public institution or was in the custody of Law Enforcement personnel.   NO  All unexpected deaths of children   NO Solid organ donors   NO Unidentified bodies   Pending Deaths of persons whose bodies are to be cremated or otherwise disposed of so that the bodies will later be unavailable for examination;   NO Deaths unattended by a physician outside of a licensed healthcare facility or licensed residential hospice program   NO Deaths occurring within 24 hours of arrival to a health care facility if death is unexpected.    NO Deaths associated with the decedent s employment.   NO Deaths attributed to acts of terrorism.   NO Any death in which there is uncertainty as to whether it is a medical examiner s care should be discussed with the medical investigator.      Death Certificate to be directed to Dr. Olivia Solis, hospitalist.    Body disposition: Body released to the morgue.    RAMONA Montiel, CNP  [facility]

## 2020-02-03 NOTE — PLAN OF CARE
Pt comfort care. Clear liquid diet. RR 20. Morphine and ativan given for comfort. Incontinent of bowel and bladder. At 0352 pt  with family at bedside.  called per family request.

## 2020-02-03 NOTE — PROGRESS NOTES
"SPIRITUAL HEALTH SERVICES Progress Note  [facility]    Visited with family (pt's daughter, granddaughter, and great-granddaughter) following the death of pt Tressa. Invited family to share pt's illness narrative. They shared the stories of the last week and the impact of Tressa's illness on both pt's and their family's lives. Pt has a supportive family who engaged in life review and meaning making through stories of Tressa and her love for each of them. Provided reflective conversation as family members processed their feelings surrounding Tressa's death. Family members \"were ready to leave\" but didn't know how \"to say goodbye.\" This author provided emotional support through an end-of-life ritual that gave each family member the opportunity to say thank you and goodbye.    This , and others, will remain available if needed again by the family.      Zoe Coles   Intern    "

## 2020-02-06 LAB
BACTERIA SPEC CULT: NO GROWTH
BACTERIA SPEC CULT: NO GROWTH
SPECIMEN SOURCE: NORMAL
SPECIMEN SOURCE: NORMAL

## 2020-02-14 NOTE — DISCHARGE SUMMARY
[facility]    Discharge Summary  Hospitalist    Date of Admission:  1/31/2020  Date of Discharge:  2/3/2020  3:52 AM  Discharging Provider: Olivia Solis MD    Discharge Diagnoses   Altered mental status with neurologic changes as described above including a right-sided gaze preference and left upper extremity dystonia.    History of Present Illness   Tressa García is a 89-year-old female with a past medical history significant for stage III colon cancer, hypertension, hyperlipidemia and type 2 diabetes among other medical problems, who was brought to the emergency room today for evaluation of altered mental status.  History is obtained per discussions with the patient's granddaughter at bedside, as well as review of the records.      The patient has a history of stage III colon cancer for which she follows with Dr. Bruce of [facility].  She has met with Palliative Care services in the past through [facility].  Most recent visit per their service was in the beginning of 12/2019.  The focus of the visit was on adequate symptom management, and she was prescribed a combination of OxyContin and oxycodone for management of her pain.  More recently, she started on chemotherapy as directed per Dr. Bruce.  She most recently received a treatment with oxaliplatin on Tuesday (1/28).  Additionally, she takes Xeloda orally, which her daughter says she has been compliant with at home.  She initially tolerated her treatment earlier this week.  Her granddaughter has been here visiting from [place] to assist with her care.  She had been living independently in her apartment.  On Wednesday (1/29) early morning, both the patient's daughter and her granddaughter were staying with her noted that she had fallen and needed assistance to get up.  She was helped back into bed and a short time later, she again had a subsequent fall while trying to get out of bed.  She was alert during these periods of time.  As the day  progressed, her condition improved, and she was able to eat and drink without difficulties thus, they deferred bringing her in for further evaluation.  On the morning of 1/30, her granddaughter contacted Dr. Bruce's clinic, and she was brought in for further evaluation.  It was thought that she may have had some degree of dehydration following her recent chemo and that she was given IV fluids.  After her infusion, she returned home, and her granddaughter noted ongoing poor oral intake.  She did not want to take her medications last evening including her pain medication and her oral chemotherapy.  She otherwise went to bed without any concerns.      This morning, upon waking, her granddaughter noted the patient seemed to be out of it.  She describes the patient as staring out the window.  She began to state something is wrong.  I think I am having a spell.  My insides are shaking.  This was a change unusual for her.  Her granddaughter became concerned and ultimately called EMS.  She was brought to the emergency room for further evaluation.  In the emergency room, she was seen and evaluated by Dr. Squires.  She was mildly hypertensive but otherwise afebrile and hemodynamically stable.  She was placed on supplemental oxygen.  A code stroke was called, and she was seen by the Stroke Neuro Service.  She was observed to have a right gaze deviation and initially some increased tone in her left upper extremity with some left-sided hemineglect.  A stat head CT, CT perfusion and CTA were without acute abnormalities.  She was given 1 mg of IV Ativan due to concern for partial complex seizures, and her left upper extremity dystonia and improved.  A spot EEG was done in the emergency room that showed no findings of seizures.  They recommended an MRI of the brain to rule out any acute intracranial abnormality.       When I saw the patient after she received 1 mg of Ativan, she was sleeping quietly.  She did open her eyes and was  able to follow simple commands but did not converse any further. Her granddaughter relayed wishes for hospitalization for evaluation of these symptoms as they were worried it could be a potential side effect of her recent chemotherapy.  She will be admitted to the hospital today for ongoing evaluation and care.  In the emergency room, she has been given IV fluids, a full-dose of rectal aspirin and 1 mg of IV Ativan.     Hospital Course   Tressa García was admitted on 1/31/2020.  The following problems were addressed during her hospitalization:    Active Problems:    Altered mental status    Tressa García is a 89-year-old female with a past medical history of stage III colon cancer, hypertension, hyperlipidemia and type 2 diabetes among other medical problems, who was brought to the emergency room this morning for evaluation of neurologic changes including altered mental status, left-sided hemineglect and dystonia in the left upper extremity.  Workup in the emergency room is concerning for possible stroke versus seizure.  She is being admitted to the hospital today for ongoing evaluation and care.      1.  Altered mental status with neurologic changes as described above including a right-sided gaze preference and left upper extremity dystonia.  A code stroke was called in the emergency room.  Initial head imaging including a stat head CT, CTA and CT perfusion scan were all negative for acute abnormalities. Reviewed MRI findings of acute bilateral strokes. Reviewed neurology recommendations for DAPT and additional workup. Reviewed poor prognosis for recovery in the setting of active malignancy and that she is unlikely going to be a candidate for ongoing chemo.  -Family declining additional stroke evals at this time. Overnight, pt did not do well. Wheezing and restless, dyspneic most of the night. Her daughter expresses understanding that Ms. García's overall life expectancy at this point is likely very short. Family  decided to make comfort care today 20. Passed away that night.      2.  Stage III colon cancer.  Follow[facility] with [facility]. Bruce of [facility]  On chemotherapy with both IV and oral chemotherapy agents. Takes Xeloda daily and most recently received oxaliplatin on 20.  Some concerns that her dystonia could have been precipitated by her chemotherapy.  She saw Dr. Bruce in followup just yesterday, on 20.   At that time, she was noted he needs some IV fluids for hydration.  She is on medications. She follows with the Palliative Care through [facility] who assists with managing her pain medications including OxyContin and oxycodone.  At this juncture, she is not safe for oral intake; thus, her pain medications will need to be held. [facility] following. Per their note yesterday, doubt will remain appropriate candidate for further systemic treatments. Passed away.      3.  Hypertension.  It sounds as though she may not have been taking her medications regularly in recent days.  She was initially mildly hypertensive on presentation.  Her pressures subsequently improved after she was given Ativan.      4.  Type 2 diabetes.  Typically manages with oral agents.  These will be held for the time being.  We will monitor Accu-Cheks and utilize sliding scale insulin as needed for now.      5.  Acute kidney injury.  at this point given we are moving towards hospice, not aggressively treating. Given lasix overnight given her SOB.        # Discharge Pain Plan:   - Patient currently has NO PAIN and is not being prescribed pain medications on discharge.    Olivia Solis MD, MD    Significant Results and Procedures       Pending Results   These results will be followed up by PCP  Unresulted Labs Ordered in the Past 30 Days of this Admission     No orders found from 2020 to 2020.          Code Status          Primary Care Physician   Olvin Keenan    Physical Exam                       Vitals:    20 0951 20 0636   Weight: 61.7 kg (136 lb) 66.8 kg (147 lb 3.2 oz)     Vital Signs with Ranges     No intake/output data recorded.    Unresponsive to noxious stimuli, Spontaneous respirations absent, Breath sounds absent, Heart sounds absent, Pupillary light reflex absent and Corneal blink reflex absent    Discharge Disposition   Patient  during this admission  Condition at discharge:     Consultations This Hospital Stay   NEUROLOGY IP CONSULT  HEMATOLOGY & ONCOLOGY IP CONSULT  SPIRITUAL HEALTH SERVICES IP CONSULT  SOCIAL WORK IP CONSULT  SPEECH LANGUAGE PATH ADULT IP CONSULT  PHYSICAL THERAPY ADULT IP CONSULT  OCCUPATIONAL THERAPY ADULT IP CONSULT  SPIRITUAL HEALTH SERVICES IP CONSULT    Time Spent on this Encounter   IOlivia MD, personally saw the patient today and spent greater than 30 minutes discharging this patient.    Discharge Orders   No discharge procedures on file.  Discharge Medications   Discharge Medication List as of 2/3/2020  7:37 AM      CONTINUE these medications which have NOT CHANGED    Details   amLODIPine (NORVASC) 2.5 MG tablet Take 2.5 mg by mouth every evening, Historical      aspirin 81 MG EC tablet Take 81 mg by mouth daily, Historical      capecitabine (XELODA) 500 MG tablet Take 1,000 mg by mouth 2 times daily For 14 days every 3 weeks per [facility], Historical      cycloSPORINE (RESTASIS) 0.05 % ophthalmic emulsion Place 1 drop into both eyes 2 times daily, Historical      furosemide (LASIX) 20 MG tablet Take 20 mg by mouth every morning, Historical      glimepiride (AMARYL) 2 MG tablet Take 2 mg by mouth every evening, Historical      losartan (COZAAR) 25 MG tablet Take 25 mg by mouth every evening, Historical      metFORMIN (GLUCOPHAGE) 1000 MG tablet Take 1,000 mg by mouth every evening, Historical      OXALIPLATIN IV Inject 200 mg into the vein every 21 days 200 mg last given on 20 per [facility],  Historical      !! oxyCODONE (OXYCONTIN) 10 MG 12 hr tablet Take 10 mg by mouth every morning Pt takes 10 mg QAM and 20 mg QPM, Historical      !! oxyCODONE (OXYCONTIN) 20 MG 12 hr tablet Take 20 mg by mouth every evening Pt takes 10 mg QAM and 20 mg QPM, Historical      oxyCODONE (ROXICODONE) 5 MG tablet Take 5 mg by mouth every 4 hours as needed for breakthrough pain or severe pain, Historical      simvastatin (ZOCOR) 10 MG tablet Take 10 mg by mouth At Bedtime, Historical       !! - Potential duplicate medications found. Please discuss with provider.        Allergies   Allergies   Allergen Reactions     Diphenhydramine Anaphylaxis     Other reaction(s): Swelling, lips/tongue     Shellfish Allergy Nausea and Vomiting     Data   Results for orders placed or performed during the hospital encounter of 01/31/20   CTA Head Neck with Contrast    Narrative    CTA  HEAD AND NECK WITH CONTRAST 1/31/2020 11:03 AM     HISTORY: Transient ischemic attack, initial exam. Code Stroke.  Dizziness. Left-sided neglect. Disorientation.    TECHNIQUE: Axial images were obtained through the head and neck with  intravenous contrast. 70 mL of Isovue-370 was given. Multiplanar  reconstructions were performed. 3-D reconstructions off a remote  workstation for CT angiography were also acquired. Carotid stenoses  were evaluated by comparing the caliber of the proximal internal  carotid artery to the caliber of the distal internal carotid artery.  Radiation dose for this scan was reduced using automated exposure  control, adjustment of the mA and/or kV according to patient size, or  iterative reconstruction technique.     COMPARISON: None.    FINDINGS:    Brachiocephalic vessels: There is some mild calcific plaque in the  thoracic arch. Brachiocephalic vessels are within normal limits.    Right carotid system: Minimal calcific plaque is seen in the carotid  bifurcation. There is no stenosis or dissection.    Left carotid system: Minimal  calcific plaque is seen in the carotid  bifurcation region. There is no evidence for stenosis or dissection.    Right vertebral artery: There is mild calcific plaque at the origin of  right vertebral artery. There is no stenosis or dissection.    Left vertebral artery: Normal.    [place]: Calcific plaque is seen in the carotid siphon  regions. There is no significant stenosis. The basilar artery is  widely patent. The proximal anterior, middle, and posterior cerebral  arteries are patent. There is no evidence for stenosis,  thromboembolism, aneurysm, or dissection. Incidental note is made of  an aplastic left A1 segment.    Other findings: Minimal degenerative changes are noted in the spine.      Impression    IMPRESSION:  1. Minimal atherosclerotic disease involving the carotid bifurcations  and carotid siphon regions without stenosis.  2. No evidence for stenosis, dissection, thromboembolism, or aneurysm.    YENNY ALVARADO MD   CT Head Perfusion w Contrast    Narrative    CT HEAD PERFUSION WITH CONTRAST 1/31/2020 10:58 AM     HISTORY: TIA, initial exam. Code Stroke. Dizziness. Disorientation.    TECHNIQUE: Axial images were obtained through the brain with  intravenous contrast for CT brain perfusion imaging. 50 mL of  Isovue-370 was given. Radiation dose for this scan was reduced using  automated exposure control, adjustment of the mA and/or kV according  to patient size, or iterative reconstruction technique.    FINDINGS: Cerebral blood flow, cerebral blood volume, and mean transit  time maps are symmetric. There is no evidence for ischemia or infarct.      Impression    IMPRESSION: Normal CT brain perfusion imaging.    YENNY ALVARADO MD   CT Head w/o Contrast    Narrative    CT SCAN OF THE HEAD WITHOUT CONTRAST   1/31/2020 10:46 AM     HISTORY: TIA, initial exam. Code Stroke. Dizziness and disorientation.    TECHNIQUE:  Axial images of the head and coronal reformations without  IV contrast material.   Radiation dose for this scan was reduced using  automated exposure control, adjustment of the mA and/or kV according  to patient size, or iterative reconstruction technique.    COMPARISON: None.    FINDINGS: Moderate cerebral atrophy is present. There is an old right  basal ganglia infarct. There is moderately extensive white matter  changes in both hemispheres without mass effect. There is no evidence  for intracranial hemorrhage, mass effect, acute infarct, or skull  fracture.      Impression    IMPRESSION:  1. 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  2. Cerebral atrophy with moderately extensive white matter changes  which are most likely due to chronic small vessel ischemic disease.  3. No evidence for intracranial hemorrhage or any definite acute  process.    YENNY ALVARADO MD   Chest XR,  PA & LAT    Narrative    CHEST TWO VIEW   1/31/2020 3:34 PM     HISTORY: Hypoxia.    COMPARISON: None.      Impression    IMPRESSION: Bilateral vascular congestion and pulmonary edema pattern.  Borderline cardiomegaly. Correlate with CHF. Patchy opacity at the  left lower lobe region that may be atelectasis or focal airspace  disease.    PETERSON MAE MD   MR Brain w/o & w Contrast    Narrative    MRI BRAIN WITHOUT AND WITH CONTRAST  1/31/2020 4:24 PM     HISTORY: Focal neurologic deficit, greater than 6 hours, stroke  suspected.    TECHNIQUE: Multiplanar, multisequence MRI of the brain without and  with 6 mL Gadavist     COMPARISON: Head CT from earlier the same day.     FINDINGS: Images degraded by motion artifact, particularly the axial  susceptibility weighted image.    Multiple areas of restricted diffusion within both cerebral  hemispheres compatible with acute infarcts. There are small acute  cortical infarcts within bilateral frontal, parietal, and occipital  lobes. The largest area of infarct is in the right occipital lobe  measuring approximately 1.9 x 1.6 cm. No definite large hemorrhagic  transformation of infarct although  evaluation is limited given the  motion artifact. Moderate T2 hyperintensity and associated regional  edema in the areas of infarct without significant midline shift or  herniation.    Marked diffuse parenchymal volume loss. Chronic-appearing infarct in  the right basal ganglia. Marked periventricular white matter T2  hyperintensities are nonspecific, but likely related to chronic  microvascular ischemic disease. Ventricular size is unchanged without  evidence of hydrocephalus.     There is no abnormal intracranial enhancement.     1.6 cm T1 isointense, T2 hyperintense mass incompletely evaluated in  the right parotid gland.      Impression    IMPRESSION:    1. Multiple bilateral cortical infarcts, the largest infarct is within  the right occipital lobe. Mild regional edema in the areas of infarct  without significant midline shift or herniation. No obvious large  parenchymal hemorrhage although evaluation is slightly limited given  motion artifact.  2. Marked diffuse parenchymal volume loss and white matter changes  likely due to chronic microvascular ischemic disease.  3. Chronic-appearing infarct in the right basal ganglia.  4. 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      BLAIR KAUR MD   Echocardiogram Complete    Narrative    [id number]  [id number]  [id number]  [id number]^WINTER^VINNY^[facility]           [facility]  Echocardiography Laboratory  [address] [place] [address]        Name: REINALDO ANDREWS  MRN: [id number]  : 1929  Study Date: 2020 01:05 PM  Age: 89 yrs  Gender: Female  Patient Location: [facility]  Reason For Study: TIA  Ordering Physician: VINNY MAX  Referring Physician: JONAS ANSARI  Performed By: Tomer Vinson RDCS     BSA: 1.5 m2  Height: 58 in  Weight: 136 lb  HR: 66  BP: 123/63 mmHg  _____________________________________________________________________________  __        Procedure  Complete Portable Echo  Adult. Optison (NDC #0411-5277) given intravenously.  _____________________________________________________________________________  __        Interpretation Summary     Technically difficult imaging  A cardiac source of embolus was not identified.  There is anterior, septal, and apical wall akinesis.  Left ventricular systolic function is moderate to severely reduced.  The visual ejection fraction is estimated at 30-35%.  Sinus rhythm was noted.  Doppler interrogaiton does not demonstrate significant stenosis or  insufficiency involving cardiac valves.  No old studies available for comparison.  _____________________________________________________________________________  __        Left Ventricle  The left ventricle is normal in size. There is normal left ventricular wall  thickness. Left ventricular systolic function is moderate to severely reduced.  The visual ejection fraction is estimated at 30-35%. Grade I or early  diastolic dysfunction. There is anterior, septal, and apical wall akinesis.  There is no thrombus seen in the left ventricle.     Right Ventricle  The right ventricle is normal in structure, function and size. There is no  mass or thrombus in the right ventricle.     Atria  Normal left atrial size. Right atrial size is normal. There is no atrial shunt  seen. The left atrial appendage is not well visualized.     Mitral Valve  The mitral valve leaflets appear normal. There is no evidence of stenosis,  fluttering, or prolapse. There is no mitral regurgitation noted. There is no  mitral valve stenosis.        Tricuspid Valve  Normal tricuspid valve. The right ventricular systolic pressure is  approximated at 21.0 mmHg plus the right atrial pressure. Right ventricle  systolic pressure estimate normal. There is mild (1+) tricuspid regurgitation.  There is no tricuspid stenosis.     Aortic Valve  There is mild trileaflet aortic sclerosis. No aortic regurgitation is present.  No aortic stenosis is  present.     Pulmonic Valve  Normal pulmonic valve. There is no pulmonic valvular regurgitation. There is  no pulmonic valvular stenosis.     Vessels  The aortic root is normal size. Normal size ascending aorta. Inferior vena  cava not well visualized for estimation of right atrial pressure. The  pulmonary artery is normal size.     Pericardium  The pericardium appears normal. There is no pleural effusion.        Rhythm  Sinus rhythm was noted.  _____________________________________________________________________________  __  MMode/2D Measurements & Calculations  IVSd: 0.75 cm     LVIDd: 4.1 cm  LVIDs: 2.7 cm  LVPWd: 0.84 cm  FS: 32.6 %  LV mass(C)d: 95.3 grams  LV mass(C)dI: 61.6 grams/m2  Ao root diam: 3.2 cm  LA dimension: 3.0 cm  LA/Ao: 0.95  LA Volume (BP): 52.5 ml  LA Volume Index (BP): 33.9 ml/m2  RWT: 0.41           Doppler Measurements & Calculations  MV E max malik: 46.2 cm/sec  MV A max malik: 82.0 cm/sec  MV E/A: 0.56  MV dec time: 0.22 sec  PA acc time: 0.11 sec  TR max malik: 229.0 cm/sec  TR max P.0 mmHg  E/E' av.5  Lateral E/e': 24.7  Medial E/e': 22.4           _____________________________________________________________________________  __           Report approved by: Dr. Rodo Ortega 2020 03:18 PM

## 2023-06-02 NOTE — PROVIDER NOTIFICATION
MD Notification    Notified Person: MD    Notified Person Name:  Dr. Friend    Notification Date/Time:  2/1/20 0741     Notification Interaction: Paged    Purpose of Notification:  BP  66/58    Orders Received:    Comments: 0356 Repaged MD; sent FYI page of recent BP of 108/51  2191 Paged regarding LA 2.3     Statement Selected